# Patient Record
Sex: FEMALE | Race: WHITE | Employment: PART TIME | URBAN - METROPOLITAN AREA
[De-identification: names, ages, dates, MRNs, and addresses within clinical notes are randomized per-mention and may not be internally consistent; named-entity substitution may affect disease eponyms.]

---
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
## 2019-09-20 LAB
BLD GP AB SCN SERPL QL: NEGATIVE
HBV SURFACE AB SER QL: NEGATIVE
HIV 1+2 AB+HIV1 P24 AG SERPL QL IA: NON REACTIVE
RPR SER QL: NON REACTIVE
RUBV IGG SERPL IA-ACNC: NORMAL

## 2019-10-08 ENCOUNTER — HOSPITAL ENCOUNTER (EMERGENCY)
Facility: HOSPITAL | Age: 19
Discharge: HOME | End: 2019-10-08
Attending: PHYSICIAN ASSISTANT
Payer: MEDICAID

## 2019-10-08 VITALS
HEART RATE: 81 BPM | BODY MASS INDEX: 28.13 KG/M2 | HEIGHT: 66 IN | OXYGEN SATURATION: 100 % | DIASTOLIC BLOOD PRESSURE: 61 MMHG | TEMPERATURE: 97 F | SYSTOLIC BLOOD PRESSURE: 103 MMHG | WEIGHT: 175 LBS | RESPIRATION RATE: 18 BRPM

## 2019-10-08 DIAGNOSIS — O23.41 URINARY TRACT INFECTION IN MOTHER DURING FIRST TRIMESTER OF PREGNANCY: Primary | ICD-10-CM

## 2019-10-08 DIAGNOSIS — R19.7 NAUSEA VOMITING AND DIARRHEA: ICD-10-CM

## 2019-10-08 DIAGNOSIS — R11.2 NAUSEA VOMITING AND DIARRHEA: ICD-10-CM

## 2019-10-08 PROCEDURE — 81001 URINALYSIS AUTO W/SCOPE: CPT | Performed by: PHYSICIAN ASSISTANT

## 2019-10-08 PROCEDURE — 81025 URINE PREGNANCY TEST: CPT

## 2019-10-08 PROCEDURE — 81025 URINE PREGNANCY TEST: CPT | Performed by: PHYSICIAN ASSISTANT

## 2019-10-08 PROCEDURE — 80048 BASIC METABOLIC PNL TOTAL CA: CPT | Performed by: PHYSICIAN ASSISTANT

## 2019-10-08 PROCEDURE — 85025 COMPLETE CBC W/AUTO DIFF WBC: CPT | Performed by: PHYSICIAN ASSISTANT

## 2019-10-08 PROCEDURE — 87086 URINE CULTURE/COLONY COUNT: CPT | Performed by: PHYSICIAN ASSISTANT

## 2019-10-08 PROCEDURE — 96374 THER/PROPH/DIAG INJ IV PUSH: CPT

## 2019-10-08 PROCEDURE — 84702 CHORIONIC GONADOTROPIN TEST: CPT | Performed by: PHYSICIAN ASSISTANT

## 2019-10-08 PROCEDURE — 96361 HYDRATE IV INFUSION ADD-ON: CPT

## 2019-10-08 PROCEDURE — 99284 EMERGENCY DEPT VISIT MOD MDM: CPT

## 2019-10-08 RX ORDER — ONDANSETRON 4 MG/1
4 TABLET, ORALLY DISINTEGRATING ORAL EVERY 6 HOURS PRN
Qty: 10 TABLET | Refills: 0 | Status: SHIPPED | OUTPATIENT
Start: 2019-10-08 | End: 2019-10-11

## 2019-10-08 RX ORDER — ONDANSETRON 2 MG/ML
4 INJECTION INTRAMUSCULAR; INTRAVENOUS ONCE
Status: COMPLETED | OUTPATIENT
Start: 2019-10-08 | End: 2019-10-08

## 2019-10-08 RX ORDER — CEPHALEXIN 500 MG/1
500 CAPSULE ORAL 3 TIMES DAILY
Qty: 21 CAPSULE | Refills: 0 | Status: SHIPPED | OUTPATIENT
Start: 2019-10-08 | End: 2019-10-08

## 2019-10-08 RX ORDER — ONDANSETRON 4 MG/1
4 TABLET, ORALLY DISINTEGRATING ORAL EVERY 6 HOURS PRN
Qty: 10 TABLET | Refills: 0 | Status: SHIPPED | OUTPATIENT
Start: 2019-10-08 | End: 2019-10-08

## 2019-10-08 RX ORDER — CEPHALEXIN 500 MG/1
500 CAPSULE ORAL 3 TIMES DAILY
Qty: 21 CAPSULE | Refills: 0 | Status: SHIPPED | OUTPATIENT
Start: 2019-10-08 | End: 2019-10-15

## 2019-10-08 NOTE — ED PROVIDER NOTES
Patient Seen in: [facility] AND [facility] Emergency Department    History   Patient presents with:  Pregnancy Issues (gynecologic)    Stated Complaint:     HPI    Patient is G 2, P 1, 12 weeks pregnant 19-year-old female presents with chief complaint of nausea, All other systems reviewed and negative except as noted above. PSFH elements reviewed from today and agreed except as otherwise stated in HPI. Physical Exam     ED Triage Vitals [10/08/19 1032]   /76   Pulse 104   Resp 20   Temp 97 °F (36. for the following components:       Result Value    Ketones Urine Trace (*)     Protein Urine 30  (*)     Urobilinogen Urine 2.0 (*)     Nitrite Urine Positive (*)     Leukocyte Esterase Urine Small (*)     WBC Urine 26 (*)     WBC Clump Few (*)     Bacter emergency department without emesis. Results reviewed and need for follow-up discussed with patient. Patient case discussed with and patient seen by Dr. Jaylin Shah.     Disposition and Plan     Clinical Impression:  Urinary tract infection in mother during f

## 2019-10-08 NOTE — ED NOTES
Patient arrives 12 weeks pregnant with complaints of nausea, vomiting, and generalized abdominal pain that began this AM around 0130. Patient denies vaginal bleeding.  Patient states she has hx of pre-eclampsia in first pregnancy, states she is anxious it w

## 2019-10-08 NOTE — ED INITIAL ASSESSMENT (HPI)
Pt reports 12 weeks with c/o generalized abd pain with nausea, vomiting. Pt reports recent US showing IUP. Denies vaginal bleeding.

## 2020-03-16 ENCOUNTER — HOSPITAL ENCOUNTER (OUTPATIENT)
Age: 20
End: 2020-03-16
Attending: OBSTETRICS & GYNECOLOGY | Admitting: OBSTETRICS & GYNECOLOGY

## 2020-03-16 ENCOUNTER — HOSPITAL ENCOUNTER (OUTPATIENT)
Dept: OBGYN | Age: 20
Discharge: HOME | End: 2020-03-16
Attending: OBSTETRICS & GYNECOLOGY

## 2020-03-16 ENCOUNTER — HOSPITAL ENCOUNTER (OUTPATIENT)
Dept: LAB | Age: 20
Discharge: HOME | End: 2020-03-16
Attending: OBSTETRICS & GYNECOLOGY

## 2020-03-16 DIAGNOSIS — Z34.83 PRENATAL CARE, SUBSEQUENT PREGNANCY, THIRD TRIMESTER: Primary | ICD-10-CM

## 2020-03-16 LAB
HIV 1+2 AB+HIV1 P24 AG SERPL QL IA: NON REACTIVE
NUM BPU REQUESTED: 1
RPR SER QL: NON REACTIVE

## 2020-03-16 PROCEDURE — 85461 HEMOGLOBIN FETAL: CPT

## 2020-03-16 PROCEDURE — 36415 COLL VENOUS BLD VENIPUNCTURE: CPT

## 2020-03-17 LAB
ABO + RH BLD: NORMAL
BLD PROD TYP BPU: NORMAL
BLD UNIT ID BPU: NORMAL
FETAL CELL SCN BLD QL ROSETTE: NEGATIVE
STATUS OF UNIT: NORMAL
UNIT DIVISION: 0

## 2020-03-31 LAB — GBS EXTERNAL: NEGATIVE

## 2020-04-07 RX ORDER — VITAMIN A ACETATE, BETA CAROTENE, ASCORBIC ACID, CHOLECALCIFEROL, .ALPHA.-TOCOPHEROL ACETATE, DL-, THIAMINE MONONITRATE, RIBOFLAVIN, NIACINAMIDE, PYRIDOXINE HYDROCHLORIDE, FOLIC ACID, CYANOCOBALAMIN, CALCIUM CARBONATE, FERROUS FUMARATE, ZINC OXIDE, CUPRIC OXIDE 3080; 12; 120; 400; 1; 1.84; 3; 20; 22; 920; 25; 200; 27; 10; 2 [IU]/1; UG/1; MG/1; [IU]/1; MG/1; MG/1; MG/1; MG/1; MG/1; [IU]/1; MG/1; MG/1; MG/1; MG/1; MG/1
1 TABLET, FILM COATED ORAL DAILY
Status: ON HOLD | COMMUNITY
End: 2022-05-13 | Stop reason: CLARIF

## 2020-04-15 ENCOUNTER — HOSPITAL ENCOUNTER (INPATIENT)
Age: 20
LOS: 2 days | Discharge: HOME | DRG: 540 | End: 2020-04-17
Attending: OBSTETRICS & GYNECOLOGY | Admitting: OBSTETRICS & GYNECOLOGY

## 2020-04-15 ENCOUNTER — ANESTHESIA (OUTPATIENT)
Dept: OBGYN | Age: 20
DRG: 540 | End: 2020-04-15

## 2020-04-15 ENCOUNTER — ANESTHESIA EVENT (OUTPATIENT)
Dept: OBGYN | Age: 20
DRG: 540 | End: 2020-04-15

## 2020-04-15 LAB
ABO + RH BLD: NORMAL
BASOPHILS # BLD: 0 K/MCL (ref 0–0.3)
BASOPHILS NFR BLD: 0 %
BLD GP AB SCN SERPL QL GEL: NORMAL
BLOOD GROUP ANTIBODIES SERPL: NORMAL
CROSSMATCH EXPIRE: NORMAL
DIFFERENTIAL METHOD BLD: ABNORMAL
EOSINOPHIL # BLD: 0 K/MCL (ref 0.1–0.5)
EOSINOPHIL NFR BLD: 0 %
ERYTHROCYTE [DISTWIDTH] IN BLOOD: 13.7 % (ref 11–15)
HCT VFR BLD CALC: 31.7 % (ref 36–46.5)
HGB BLD-MCNC: 10.2 G/DL (ref 12–15.5)
IMM GRANULOCYTES # BLD AUTO: 0 K/MCL (ref 0–0.2)
IMM GRANULOCYTES NFR BLD: 0 %
LYMPHOCYTES # BLD: 1.7 K/MCL (ref 1.2–5.2)
LYMPHOCYTES NFR BLD: 23 %
MCH RBC QN AUTO: 26.9 PG (ref 26–34)
MCHC RBC AUTO-ENTMCNC: 32.2 G/DL (ref 32–36.5)
MCV RBC AUTO: 83.6 FL (ref 78–100)
MONOCYTES # BLD: 0.6 K/MCL (ref 0.3–0.9)
MONOCYTES NFR BLD: 9 %
NEUTROPHILS # BLD: 4.9 K/MCL (ref 1.8–8)
NEUTROPHILS NFR BLD: 68 %
NRBC BLD MANUAL-RTO: 0 /100 WBC
PLATELET # BLD: 132 K/MCL (ref 140–450)
RBC # BLD: 3.79 MIL/MCL (ref 4–5.2)
WBC # BLD: 7.3 K/MCL (ref 4.2–11)

## 2020-04-15 PROCEDURE — 10002800 HB RX 250 W HCPCS

## 2020-04-15 PROCEDURE — 10002800 HB RX 250 W HCPCS: Performed by: OBSTETRICS & GYNECOLOGY

## 2020-04-15 PROCEDURE — 85025 COMPLETE CBC W/AUTO DIFF WBC: CPT

## 2020-04-15 PROCEDURE — 13000037 HB COMPLEX CASE EACH ADD MINUTE: Performed by: OBSTETRICS & GYNECOLOGY

## 2020-04-15 PROCEDURE — 10004452 HB PACU ADDL 30 MINUTES

## 2020-04-15 PROCEDURE — 86870 RBC ANTIBODY IDENTIFICATION: CPT

## 2020-04-15 PROCEDURE — 86850 RBC ANTIBODY SCREEN: CPT

## 2020-04-15 PROCEDURE — 10002803 HB RX 637: Performed by: OBSTETRICS & GYNECOLOGY

## 2020-04-15 PROCEDURE — 10006023 HB SUPPLY 272: Performed by: OBSTETRICS & GYNECOLOGY

## 2020-04-15 PROCEDURE — 10000002 HB ROOM CHARGE MED SURG

## 2020-04-15 PROCEDURE — 10002807 HB RX 258: Performed by: OBSTETRICS & GYNECOLOGY

## 2020-04-15 PROCEDURE — 13000036 HB COMPLEX  CASE S/U + 1ST 15 MIN: Performed by: OBSTETRICS & GYNECOLOGY

## 2020-04-15 PROCEDURE — 10004452 HB PACU ADDL 30 MINUTES: Performed by: OBSTETRICS & GYNECOLOGY

## 2020-04-15 PROCEDURE — 10004451 HB PACU RECOVERY 1ST 30 MINUTES: Performed by: OBSTETRICS & GYNECOLOGY

## 2020-04-15 PROCEDURE — 13000018 HB ANESTHESIA BLOCK IN L&D: Performed by: OBSTETRICS & GYNECOLOGY

## 2020-04-15 PROCEDURE — 10004451 HB PACU RECOVERY 1ST 30 MINUTES

## 2020-04-15 PROCEDURE — 13001456 HB PERINATAL CARE

## 2020-04-15 PROCEDURE — 10002807 HB RX 258

## 2020-04-15 RX ORDER — ACETAMINOPHEN 500 MG
1000 TABLET ORAL EVERY 8 HOURS SCHEDULED
Status: DISCONTINUED | OUTPATIENT
Start: 2020-04-15 | End: 2020-04-17 | Stop reason: HOSPADM

## 2020-04-15 RX ORDER — 0.9 % SODIUM CHLORIDE 0.9 %
2 VIAL (ML) INJECTION EVERY 12 HOURS SCHEDULED
Status: DISCONTINUED | OUTPATIENT
Start: 2020-04-15 | End: 2020-04-15

## 2020-04-15 RX ORDER — ONDANSETRON 2 MG/ML
4 INJECTION INTRAMUSCULAR; INTRAVENOUS 2 TIMES DAILY PRN
Status: DISCONTINUED | OUTPATIENT
Start: 2020-04-15 | End: 2020-04-15

## 2020-04-15 RX ORDER — CALCIUM CARBONATE 500 MG/1
500 TABLET, CHEWABLE ORAL EVERY 4 HOURS PRN
Status: DISCONTINUED | OUTPATIENT
Start: 2020-04-15 | End: 2020-04-17 | Stop reason: HOSPADM

## 2020-04-15 RX ORDER — SODIUM CHLORIDE, SODIUM LACTATE, POTASSIUM CHLORIDE, CALCIUM CHLORIDE 600; 310; 30; 20 MG/100ML; MG/100ML; MG/100ML; MG/100ML
INJECTION, SOLUTION INTRAVENOUS CONTINUOUS
Status: DISCONTINUED | OUTPATIENT
Start: 2020-04-15 | End: 2020-04-17 | Stop reason: HOSPADM

## 2020-04-15 RX ORDER — SIMETHICONE 80 MG
80 TABLET,CHEWABLE ORAL 4 TIMES DAILY PRN
Status: DISCONTINUED | OUTPATIENT
Start: 2020-04-15 | End: 2020-04-17 | Stop reason: HOSPADM

## 2020-04-15 RX ORDER — DIPHENHYDRAMINE HYDROCHLORIDE 50 MG/ML
25 INJECTION INTRAMUSCULAR; INTRAVENOUS
Status: DISCONTINUED | OUTPATIENT
Start: 2020-04-15 | End: 2020-04-15

## 2020-04-15 RX ORDER — VITAMIN A, VITAMIN C, VITAMIN D-3, VITAMIN E, VITAMIN B-1, VITAMIN B-2, NIACIN, VITAMIN B-6, CALCIUM, IRON, ZINC, COPPER 4000; 120; 400; 22; 1.84; 3; 20; 10; 1; 12; 200; 27; 25; 2 [IU]/1; MG/1; [IU]/1; MG/1; MG/1; MG/1; MG/1; MG/1; MG/1; UG/1; MG/1; MG/1; MG/1; MG/1
1 TABLET ORAL DAILY
Status: DISCONTINUED | OUTPATIENT
Start: 2020-04-15 | End: 2020-04-17 | Stop reason: HOSPADM

## 2020-04-15 RX ORDER — SODIUM CHLORIDE, SODIUM LACTATE, POTASSIUM CHLORIDE, CALCIUM CHLORIDE 600; 310; 30; 20 MG/100ML; MG/100ML; MG/100ML; MG/100ML
INJECTION, SOLUTION INTRAVENOUS CONTINUOUS
Status: DISCONTINUED | OUTPATIENT
Start: 2020-04-15 | End: 2020-04-15 | Stop reason: SDUPTHER

## 2020-04-15 RX ORDER — LABETALOL 200 MG/1
200 TABLET, FILM COATED ORAL EVERY 12 HOURS SCHEDULED
Status: DISCONTINUED | OUTPATIENT
Start: 2020-04-15 | End: 2020-04-17 | Stop reason: HOSPADM

## 2020-04-15 RX ORDER — ONDANSETRON 2 MG/ML
4 INJECTION INTRAMUSCULAR; INTRAVENOUS EVERY 12 HOURS PRN
Status: DISCONTINUED | OUTPATIENT
Start: 2020-04-15 | End: 2020-04-17 | Stop reason: HOSPADM

## 2020-04-15 RX ORDER — IBUPROFEN 600 MG/1
600 TABLET ORAL EVERY 6 HOURS SCHEDULED
Status: DISCONTINUED | OUTPATIENT
Start: 2020-04-16 | End: 2020-04-17 | Stop reason: HOSPADM

## 2020-04-15 RX ORDER — PHENYLEPHRINE HYDROCHLORIDE 10 MG/ML
INJECTION, SOLUTION INTRAMUSCULAR; INTRAVENOUS; SUBCUTANEOUS PRN
Status: DISCONTINUED | OUTPATIENT
Start: 2020-04-15 | End: 2020-04-15

## 2020-04-15 RX ORDER — NALOXONE HCL 0.4 MG/ML
0.1 VIAL (ML) INJECTION PRN
Status: DISCONTINUED | OUTPATIENT
Start: 2020-04-15 | End: 2020-04-15

## 2020-04-15 RX ORDER — OXYTOCIN/0.9 % SODIUM CHLORIDE 30/500 ML
0-334 PLASTIC BAG, INJECTION (ML) INTRAVENOUS CONTINUOUS
Status: DISCONTINUED | OUTPATIENT
Start: 2020-04-15 | End: 2020-04-17 | Stop reason: HOSPADM

## 2020-04-15 RX ORDER — MIDAZOLAM HYDROCHLORIDE 1 MG/ML
INJECTION, SOLUTION INTRAMUSCULAR; INTRAVENOUS PRN
Status: DISCONTINUED | OUTPATIENT
Start: 2020-04-15 | End: 2020-04-15

## 2020-04-15 RX ORDER — OXYCODONE HYDROCHLORIDE 5 MG/1
5 TABLET ORAL EVERY 4 HOURS PRN
Status: DISCONTINUED | OUTPATIENT
Start: 2020-04-15 | End: 2020-04-17 | Stop reason: HOSPADM

## 2020-04-15 RX ORDER — AMOXICILLIN 250 MG
2 CAPSULE ORAL DAILY PRN
Status: DISCONTINUED | OUTPATIENT
Start: 2020-04-15 | End: 2020-04-17 | Stop reason: HOSPADM

## 2020-04-15 RX ORDER — HYDROCORTISONE ACETATE 25 MG/1
25 SUPPOSITORY RECTAL EVERY 8 HOURS PRN
Status: DISCONTINUED | OUTPATIENT
Start: 2020-04-15 | End: 2020-04-17 | Stop reason: HOSPADM

## 2020-04-15 RX ORDER — ONDANSETRON 2 MG/ML
INJECTION INTRAMUSCULAR; INTRAVENOUS PRN
Status: DISCONTINUED | OUTPATIENT
Start: 2020-04-15 | End: 2020-04-15

## 2020-04-15 RX ORDER — CITRIC ACID/SODIUM CITRATE 334-500MG
30 SOLUTION, ORAL ORAL ONCE
Status: DISCONTINUED | OUTPATIENT
Start: 2020-04-15 | End: 2020-04-15

## 2020-04-15 RX ORDER — OXYTOCIN/0.9 % SODIUM CHLORIDE 30/500 ML
0-334 PLASTIC BAG, INJECTION (ML) INTRAVENOUS CONTINUOUS
Status: DISCONTINUED | OUTPATIENT
Start: 2020-04-15 | End: 2020-04-15

## 2020-04-15 RX ORDER — HYDRALAZINE HYDROCHLORIDE 20 MG/ML
10 INJECTION INTRAMUSCULAR; INTRAVENOUS
Status: DISCONTINUED | OUTPATIENT
Start: 2020-04-15 | End: 2020-04-17 | Stop reason: HOSPADM

## 2020-04-15 RX ADMIN — SODIUM CHLORIDE, POTASSIUM CHLORIDE, SODIUM LACTATE AND CALCIUM CHLORIDE: 600; 310; 30; 20 INJECTION, SOLUTION INTRAVENOUS at 07:20

## 2020-04-15 RX ADMIN — CEFAZOLIN SODIUM 2000 MG: 300 INJECTION, POWDER, LYOPHILIZED, FOR SOLUTION INTRAVENOUS at 13:45

## 2020-04-15 RX ADMIN — CEFAZOLIN SODIUM 2000 MG: 300 INJECTION, POWDER, LYOPHILIZED, FOR SOLUTION INTRAVENOUS at 07:27

## 2020-04-15 RX ADMIN — PHENYLEPHRINE HYDROCHLORIDE 40 MCG: 10 INJECTION, SOLUTION INTRAMUSCULAR; INTRAVENOUS; SUBCUTANEOUS at 08:23

## 2020-04-15 RX ADMIN — KETOROLAC TROMETHAMINE 30 MG: 30 INJECTION, SOLUTION INTRAMUSCULAR at 18:17

## 2020-04-15 RX ADMIN — KETOROLAC TROMETHAMINE 30 MG: 30 INJECTION, SOLUTION INTRAMUSCULAR at 09:19

## 2020-04-15 RX ADMIN — PHENYLEPHRINE HYDROCHLORIDE 40 MCG: 10 INJECTION INTRAVENOUS at 07:50

## 2020-04-15 RX ADMIN — OXYTOCIN-SODIUM CHLORIDE 0.9% IV SOLN 30 UNIT/500ML 335 ML/HR: 30-0.9/5 SOLUTION at 08:10

## 2020-04-15 RX ADMIN — ONDANSETRON 4 MG: 2 INJECTION INTRAMUSCULAR; INTRAVENOUS at 13:54

## 2020-04-15 RX ADMIN — LABETALOL 200 MG: 200 TABLET, FILM COATED ORAL at 18:02

## 2020-04-15 RX ADMIN — ONDANSETRON 4 MG: 2 INJECTION INTRAMUSCULAR; INTRAVENOUS at 08:00

## 2020-04-15 RX ADMIN — SODIUM CHLORIDE, SODIUM LACTATE, POTASSIUM CHLORIDE, AND CALCIUM CHLORIDE: .6; .31; .03; .02 INJECTION, SOLUTION INTRAVENOUS at 12:12

## 2020-04-15 RX ADMIN — SODIUM CHLORIDE, POTASSIUM CHLORIDE, SODIUM LACTATE AND CALCIUM CHLORIDE: 600; 310; 30; 20 INJECTION, SOLUTION INTRAVENOUS at 07:50

## 2020-04-15 RX ADMIN — MIDAZOLAM HYDROCHLORIDE 2 MG: 1 INJECTION, SOLUTION INTRAMUSCULAR; INTRAVENOUS at 08:12

## 2020-04-15 ASSESSMENT — LIFESTYLE VARIABLES
ARE YOU BLIND OR DO YOU HAVE SERIOUS DIFFICULTY SEEING, EVEN WHEN WEARING GLASSES: NO
HOW OFTEN DO YOU HAVE A DRINK CONTAINING ALCOHOL: NEVER
CHRONIC/CANCER PAIN PRESENT: NO
ALCOHOL_USE_STATUS: NO OR LOW RISK WITH VALIDATED TOOL
ADL NEEDS ASSIST: NO
AUDIT-C TOTAL SCORE: 0
SHORT OF BREATH OR FATIGUE WITH ADLS: NO
ADL BEFORE ADMISSION: INDEPENDENT
HOW MANY STANDARD DRINKS CONTAINING ALCOHOL DO YOU HAVE ON A TYPICAL DAY: 0,1 OR 2
HOW OFTEN DO YOU HAVE 6 OR MORE DRINKS ON ONE OCCASION: NEVER
ARE YOU DEAF OR DO YOU HAVE SERIOUS DIFFICULTY  HEARING: NO
IS PATIENT ABLE TO COMPLETE ASSESSMENT AT THIS TIME: YES
RECENT DECLINE IN ADLS: NO

## 2020-04-15 ASSESSMENT — ACTIVITIES OF DAILY LIVING (ADL): ADL_SCORE: 12

## 2020-04-15 ASSESSMENT — COGNITIVE AND FUNCTIONAL STATUS - GENERAL
BECAUSE OF A PHYSICAL, MENTAL, OR EMOTIONAL CONDITION, DO YOU HAVE DIFFICULTY DOING ERRANDS ALONE: NO
BECAUSE OF A PHYSICAL, MENTAL, OR EMOTIONAL CONDITION, DO YOU HAVE SERIOUS DIFFICULTY CONCENTRATING, REMEMBERING OR MAKING DECISIONS: NO
DO YOU HAVE SERIOUS DIFFICULTY WALKING OR CLIMBING STAIRS: NO
DO YOU HAVE DIFFICULTY DRESSING OR BATHING: NO

## 2020-04-15 ASSESSMENT — PAIN SCALES - GENERAL
PAINLEVEL_OUTOF10: 3
PAINLEVEL_OUTOF10: 0
PAINLEVEL_OUTOF10: 1
PAINLEVEL_OUTOF10: 0

## 2020-04-16 LAB
ERYTHROCYTE [DISTWIDTH] IN BLOOD: 13.7 % (ref 11–15)
HCT VFR BLD CALC: 25.4 % (ref 36–46.5)
HGB BLD-MCNC: 8.3 G/DL (ref 12–15.5)
MCH RBC QN AUTO: 27.5 PG (ref 26–34)
MCHC RBC AUTO-ENTMCNC: 32.7 G/DL (ref 32–36.5)
MCV RBC AUTO: 84.1 FL (ref 78–100)
NRBC BLD MANUAL-RTO: 0 /100 WBC
PLATELET # BLD: 119 K/MCL (ref 140–450)
RBC # BLD: 3.02 MIL/MCL (ref 4–5.2)
WBC # BLD: 8.4 K/MCL (ref 4.2–11)

## 2020-04-16 PROCEDURE — 10000002 HB ROOM CHARGE MED SURG

## 2020-04-16 PROCEDURE — 86900 BLOOD TYPING SEROLOGIC ABO: CPT

## 2020-04-16 PROCEDURE — 10002803 HB RX 637: Performed by: OBSTETRICS & GYNECOLOGY

## 2020-04-16 PROCEDURE — 36415 COLL VENOUS BLD VENIPUNCTURE: CPT

## 2020-04-16 PROCEDURE — 10002800 HB RX 250 W HCPCS: Performed by: OBSTETRICS & GYNECOLOGY

## 2020-04-16 PROCEDURE — 10004651 HB RX, NO CHARGE ITEM: Performed by: OBSTETRICS & GYNECOLOGY

## 2020-04-16 PROCEDURE — 85027 COMPLETE CBC AUTOMATED: CPT

## 2020-04-16 RX ADMIN — IBUPROFEN 600 MG: 600 TABLET ORAL at 18:20

## 2020-04-16 RX ADMIN — OXYCODONE HYDROCHLORIDE 5 MG: 5 TABLET ORAL at 10:09

## 2020-04-16 RX ADMIN — IBUPROFEN 600 MG: 600 TABLET ORAL at 11:48

## 2020-04-16 RX ADMIN — VITAMIN A, VITAMIN C, VITAMIN D-3, VITAMIN E, VITAMIN B-1, VITAMIN B-2, NIACIN, VITAMIN B-6, CALCIUM, IRON, ZINC, COPPER 1 TABLET: 4000; 120; 400; 22; 1.84; 3; 20; 10; 1; 12; 200; 27; 25; 2 TABLET ORAL at 10:09

## 2020-04-16 RX ADMIN — SIMETHICONE CHEW TAB 80 MG 80 MG: 80 TABLET ORAL at 17:18

## 2020-04-16 RX ADMIN — ACETAMINOPHEN 1000 MG: 500 TABLET ORAL at 06:45

## 2020-04-16 RX ADMIN — SENNOSIDES AND DOCUSATE SODIUM 2 TABLET: 8.6; 5 TABLET ORAL at 10:10

## 2020-04-16 RX ADMIN — KETOROLAC TROMETHAMINE 30 MG: 30 INJECTION, SOLUTION INTRAMUSCULAR at 05:35

## 2020-04-16 RX ADMIN — ACETAMINOPHEN 1000 MG: 500 TABLET ORAL at 14:33

## 2020-04-16 RX ADMIN — LABETALOL 200 MG: 200 TABLET, FILM COATED ORAL at 18:58

## 2020-04-16 RX ADMIN — ACETAMINOPHEN 1000 MG: 500 TABLET ORAL at 22:34

## 2020-04-16 RX ADMIN — KETOROLAC TROMETHAMINE 30 MG: 30 INJECTION, SOLUTION INTRAMUSCULAR at 00:04

## 2020-04-16 RX ADMIN — LABETALOL 200 MG: 200 TABLET, FILM COATED ORAL at 06:45

## 2020-04-16 RX ADMIN — OXYCODONE HYDROCHLORIDE 5 MG: 5 TABLET ORAL at 19:00

## 2020-04-16 ASSESSMENT — PAIN SCALES - GENERAL
PAINLEVEL_OUTOF10: 3
PAINLEVEL_OUTOF10: 2
PAINLEVEL_OUTOF10: 3
PAINLEVEL_OUTOF10: 2
PAINLEVEL_OUTOF10: 3
PAINLEVEL_OUTOF10: 3

## 2020-04-16 ASSESSMENT — PAIN DESCRIPTION - PAIN TYPE: TYPE: ACUTE PAIN

## 2020-04-16 ASSESSMENT — PAIN SCALES - WONG BAKER
WONGBAKER_NUMERICALRESPONSE: 0

## 2020-04-17 VITALS
TEMPERATURE: 98.1 F | BODY MASS INDEX: 35.84 KG/M2 | HEART RATE: 70 BPM | OXYGEN SATURATION: 100 % | HEIGHT: 66 IN | WEIGHT: 223 LBS | RESPIRATION RATE: 16 BRPM | DIASTOLIC BLOOD PRESSURE: 83 MMHG | SYSTOLIC BLOOD PRESSURE: 127 MMHG

## 2020-04-17 PROCEDURE — 10002803 HB RX 637: Performed by: OBSTETRICS & GYNECOLOGY

## 2020-04-17 PROCEDURE — 90792 PSYCH DIAG EVAL W/MED SRVCS: CPT | Performed by: PSYCHIATRY & NEUROLOGY

## 2020-04-17 PROCEDURE — 13001086 HB INCENTIVE SPIROMETER W INSTRUCT

## 2020-04-17 PROCEDURE — 10002016 HB COUNTER INCENTIVE SPIROMETRY

## 2020-04-17 PROCEDURE — 10004651 HB RX, NO CHARGE ITEM: Performed by: OBSTETRICS & GYNECOLOGY

## 2020-04-17 RX ORDER — IBUPROFEN 600 MG/1
600 TABLET ORAL EVERY 6 HOURS PRN
Qty: 50 TABLET | Refills: 1 | Status: ON HOLD | OUTPATIENT
Start: 2020-04-17 | End: 2022-05-13 | Stop reason: CLARIF

## 2020-04-17 RX ORDER — HYDROCODONE BITARTRATE AND ACETAMINOPHEN 10; 325 MG/1; MG/1
1 TABLET ORAL EVERY 6 HOURS PRN
Qty: 24 TABLET | Refills: 0 | Status: ON HOLD | OUTPATIENT
Start: 2020-04-17 | End: 2022-05-13 | Stop reason: CLARIF

## 2020-04-17 RX ORDER — SERTRALINE HYDROCHLORIDE 25 MG/1
25 TABLET, FILM COATED ORAL DAILY
Status: DISCONTINUED | OUTPATIENT
Start: 2020-04-17 | End: 2020-04-17

## 2020-04-17 RX ADMIN — ACETAMINOPHEN 1000 MG: 500 TABLET ORAL at 06:30

## 2020-04-17 RX ADMIN — IBUPROFEN 600 MG: 600 TABLET ORAL at 00:16

## 2020-04-17 RX ADMIN — ACETAMINOPHEN 1000 MG: 500 TABLET ORAL at 14:11

## 2020-04-17 RX ADMIN — IBUPROFEN 600 MG: 600 TABLET ORAL at 06:30

## 2020-04-17 RX ADMIN — IBUPROFEN 600 MG: 600 TABLET ORAL at 12:27

## 2020-04-17 RX ADMIN — OXYCODONE HYDROCHLORIDE 5 MG: 5 TABLET ORAL at 03:30

## 2020-04-17 RX ADMIN — OXYCODONE HYDROCHLORIDE 5 MG: 5 TABLET ORAL at 08:58

## 2020-04-17 RX ADMIN — LABETALOL 200 MG: 200 TABLET, FILM COATED ORAL at 07:00

## 2020-04-17 RX ADMIN — VITAMIN A, VITAMIN C, VITAMIN D-3, VITAMIN E, VITAMIN B-1, VITAMIN B-2, NIACIN, VITAMIN B-6, CALCIUM, IRON, ZINC, COPPER 1 TABLET: 4000; 120; 400; 22; 1.84; 3; 20; 10; 1; 12; 200; 27; 25; 2 TABLET ORAL at 08:58

## 2020-04-17 ASSESSMENT — PAIN SCALES - GENERAL
PAINLEVEL_OUTOF10: 3

## 2020-09-04 ENCOUNTER — APPOINTMENT (OUTPATIENT)
Dept: BEHAVIORAL HEALTH | Age: 20
End: 2020-09-04

## 2020-09-14 ENCOUNTER — APPOINTMENT (OUTPATIENT)
Dept: BEHAVIORAL HEALTH | Age: 20
End: 2020-09-14

## 2020-10-15 ENCOUNTER — MED REC SCAN ONLY (OUTPATIENT)
Dept: FAMILY MEDICINE CLINIC | Facility: CLINIC | Age: 20
End: 2020-10-15

## 2020-10-22 ENCOUNTER — OFFICE VISIT (OUTPATIENT)
Dept: FAMILY MEDICINE CLINIC | Facility: CLINIC | Age: 20
End: 2020-10-22
Payer: MEDICAID

## 2020-10-22 ENCOUNTER — HOSPITAL ENCOUNTER (OUTPATIENT)
Dept: GENERAL RADIOLOGY | Age: 20
Discharge: HOME | End: 2020-10-22
Attending: FAMILY MEDICINE
Payer: MEDICAID

## 2020-10-22 VITALS
WEIGHT: 186 LBS | HEART RATE: 73 BPM | SYSTOLIC BLOOD PRESSURE: 133 MMHG | HEIGHT: 66 IN | DIASTOLIC BLOOD PRESSURE: 82 MMHG | BODY MASS INDEX: 29.89 KG/M2

## 2020-10-22 DIAGNOSIS — M25.521 RIGHT ELBOW PAIN: ICD-10-CM

## 2020-10-22 DIAGNOSIS — M25.521 RIGHT ELBOW PAIN: Primary | ICD-10-CM

## 2020-10-22 PROCEDURE — 73080 X-RAY EXAM OF ELBOW: CPT | Performed by: FAMILY MEDICINE

## 2020-10-22 PROCEDURE — 3008F BODY MASS INDEX DOCD: CPT | Performed by: FAMILY MEDICINE

## 2020-10-22 PROCEDURE — 3079F DIAST BP 80-89 MM HG: CPT | Performed by: FAMILY MEDICINE

## 2020-10-22 PROCEDURE — 99214 OFFICE O/P EST MOD 30 MIN: CPT | Performed by: FAMILY MEDICINE

## 2020-10-22 PROCEDURE — 3075F SYST BP GE 130 - 139MM HG: CPT | Performed by: FAMILY MEDICINE

## 2020-10-22 NOTE — PROGRESS NOTES
HPI:    Patient ID: Tyesha Chang is a 23year old female.     HPI  Patient presents with:  Lump: lump or hernia  on abdomen with growth  Arm Pain: RT arm pain with movement due to hiting elbow when opening wine glass     Review of Systems   Constitutiona

## 2020-10-27 ENCOUNTER — TELEPHONE (OUTPATIENT)
Dept: FAMILY MEDICINE CLINIC | Facility: CLINIC | Age: 20
End: 2020-10-27

## 2020-10-27 NOTE — TELEPHONE ENCOUNTER
Pt calling for result of elbow x-ray; states saw result on MyChart but did not see Dr Larry Bernstein recommendation note. Patient informed of result and recommendations, as per provider's result note copied here.  Patient voiced understanding and agrees with rec

## 2020-11-19 ENCOUNTER — HOSPITAL ENCOUNTER (EMERGENCY)
Facility: HOSPITAL | Age: 20
Discharge: HOME | End: 2020-11-19
Attending: EMERGENCY MEDICINE
Payer: MEDICAID

## 2020-11-19 ENCOUNTER — NURSE TRIAGE (OUTPATIENT)
Dept: FAMILY MEDICINE CLINIC | Facility: CLINIC | Age: 20
End: 2020-11-19

## 2020-11-19 VITALS
HEIGHT: 66 IN | DIASTOLIC BLOOD PRESSURE: 96 MMHG | BODY MASS INDEX: 30.53 KG/M2 | HEART RATE: 75 BPM | OXYGEN SATURATION: 97 % | WEIGHT: 190 LBS | SYSTOLIC BLOOD PRESSURE: 123 MMHG | RESPIRATION RATE: 18 BRPM | TEMPERATURE: 97 F

## 2020-11-19 DIAGNOSIS — G43.109 MIGRAINE WITH AURA AND WITHOUT STATUS MIGRAINOSUS, NOT INTRACTABLE: Primary | ICD-10-CM

## 2020-11-19 PROCEDURE — 81025 URINE PREGNANCY TEST: CPT

## 2020-11-19 PROCEDURE — 99282 EMERGENCY DEPT VISIT SF MDM: CPT

## 2020-11-19 NOTE — ED INITIAL ASSESSMENT (HPI)
Pt arrived to ED from work c/o headache, blurry vision and spots that started approx 1 hour PTA. Pt reports nausea and 2 episodes of emesis, pt also reports photosensitivity. Pt denies hx of migraines or headaches, denies trauma to head.

## 2020-11-19 NOTE — TELEPHONE ENCOUNTER
Recommend see if optometry  ophtho can see her promptly Right UE venous duplex completed. Final results to follow.

## 2020-11-19 NOTE — ED NOTES
Pt to ED via private vehicle with family- states at work and began having bilateral eye blurriness, worse in left eye, that lasted approximately 1-10 minutes and then subsided. Pt went to [facility] and there \"no one was able to tell me anything. \"

## 2020-11-19 NOTE — TELEPHONE ENCOUNTER
Action Requested: Summary for Provider     []  Critical Lab, Recommendations Needed  [] Need Additional Advice  []   FYI    []   Need Orders  [] Need Medications Sent to Pharmacy  []  Other     SUMMARY: pt states she started having vision problem in her le

## 2020-11-20 NOTE — ED PROVIDER NOTES
Patient Seen in: [facility] AND [facility] Emergency Department    History   Patient presents with:  Headache  Eye Visual Problem    Stated Complaint: headache and blurred vision while working on the computer    HPI  Pt c/o a 7/10 headache that began over an raymond Ht 167.6 cm (5' 6\")   Wt 86.2 kg   LMP 07/27/2020   SpO2 97%   BMI 30.67 kg/m²   PULSE OX Nl on room air    Constitutional:  No acute distress  HEENT:  Head normocephalic and atraumatic. No scleral icterus or erythema.  Pharynx moist without erythema or ex

## 2020-11-22 ENCOUNTER — HOSPITAL ENCOUNTER (OUTPATIENT)
Age: 20
Discharge: HOME | End: 2020-11-22
Payer: MEDICAID

## 2020-11-22 VITALS
RESPIRATION RATE: 18 BRPM | SYSTOLIC BLOOD PRESSURE: 137 MMHG | DIASTOLIC BLOOD PRESSURE: 82 MMHG | HEART RATE: 87 BPM | OXYGEN SATURATION: 99 % | TEMPERATURE: 98 F

## 2020-11-22 DIAGNOSIS — Z20.822 ENCOUNTER FOR LABORATORY TESTING FOR COVID-19 VIRUS: Primary | ICD-10-CM

## 2020-11-22 PROCEDURE — 99213 OFFICE O/P EST LOW 20 MIN: CPT

## 2020-11-22 NOTE — ED INITIAL ASSESSMENT (HPI)
PATIENT ARRIVED AMBULATORY TO ROOM FOR COVID TESTING. PATIENT STATES SHE LIVES WITH HER MOTHER WHO TESTED POSITIVE. NO SYMPTOMS.

## 2020-11-22 NOTE — ED PROVIDER NOTES
Patient Seen in: [facility]      History   Patient presents with:  Testing    Stated Complaint: covid test    HPI    24 yo female here for COVID testing. Pt's mother (whom she lives with) tested + for Henri recently.   Pt is asymptomatic and Rate and Rhythm: Normal rate. Pulmonary:      Effort: Pulmonary effort is normal.   Abdominal:      General: Abdomen is flat. Musculoskeletal: Normal range of motion. Skin:     General: Skin is warm.    Neurological:      General: No focal defici

## 2020-12-14 ENCOUNTER — HOSPITAL ENCOUNTER (EMERGENCY)
Facility: HOSPITAL | Age: 20
Discharge: HOME | End: 2020-12-14
Attending: PHYSICIAN ASSISTANT
Payer: MEDICAID

## 2020-12-14 ENCOUNTER — NURSE TRIAGE (OUTPATIENT)
Dept: FAMILY MEDICINE CLINIC | Facility: CLINIC | Age: 20
End: 2020-12-14

## 2020-12-14 VITALS
HEART RATE: 88 BPM | RESPIRATION RATE: 18 BRPM | SYSTOLIC BLOOD PRESSURE: 133 MMHG | OXYGEN SATURATION: 100 % | TEMPERATURE: 99 F | WEIGHT: 180 LBS | BODY MASS INDEX: 28.93 KG/M2 | HEIGHT: 66 IN | DIASTOLIC BLOOD PRESSURE: 80 MMHG

## 2020-12-14 DIAGNOSIS — N39.0 URINARY TRACT INFECTION WITH HEMATURIA, SITE UNSPECIFIED: Primary | ICD-10-CM

## 2020-12-14 DIAGNOSIS — R31.9 URINARY TRACT INFECTION WITH HEMATURIA, SITE UNSPECIFIED: Primary | ICD-10-CM

## 2020-12-14 DIAGNOSIS — R11.2 NAUSEA AND VOMITING IN ADULT: ICD-10-CM

## 2020-12-14 PROCEDURE — 81025 URINE PREGNANCY TEST: CPT

## 2020-12-14 PROCEDURE — 87088 URINE BACTERIA CULTURE: CPT | Performed by: PHYSICIAN ASSISTANT

## 2020-12-14 PROCEDURE — 96375 TX/PRO/DX INJ NEW DRUG ADDON: CPT

## 2020-12-14 PROCEDURE — 96374 THER/PROPH/DIAG INJ IV PUSH: CPT

## 2020-12-14 PROCEDURE — 99284 EMERGENCY DEPT VISIT MOD MDM: CPT

## 2020-12-14 PROCEDURE — 81001 URINALYSIS AUTO W/SCOPE: CPT | Performed by: PHYSICIAN ASSISTANT

## 2020-12-14 PROCEDURE — 96361 HYDRATE IV INFUSION ADD-ON: CPT

## 2020-12-14 PROCEDURE — 87086 URINE CULTURE/COLONY COUNT: CPT | Performed by: PHYSICIAN ASSISTANT

## 2020-12-14 PROCEDURE — 85025 COMPLETE CBC W/AUTO DIFF WBC: CPT | Performed by: PHYSICIAN ASSISTANT

## 2020-12-14 PROCEDURE — 80048 BASIC METABOLIC PNL TOTAL CA: CPT | Performed by: PHYSICIAN ASSISTANT

## 2020-12-14 PROCEDURE — 87186 SC STD MICRODIL/AGAR DIL: CPT | Performed by: PHYSICIAN ASSISTANT

## 2020-12-14 RX ORDER — IBUPROFEN 600 MG/1
TABLET ORAL
Qty: 20 TABLET | Refills: 0 | Status: SHIPPED | OUTPATIENT
Start: 2020-12-14 | End: 2021-08-24

## 2020-12-14 RX ORDER — CIPROFLOXACIN 500 MG/1
500 TABLET, FILM COATED ORAL 2 TIMES DAILY
Qty: 14 TABLET | Refills: 0 | Status: SHIPPED | OUTPATIENT
Start: 2020-12-14 | End: 2020-12-21

## 2020-12-14 RX ORDER — ONDANSETRON 4 MG/1
4 TABLET, ORALLY DISINTEGRATING ORAL EVERY 6 HOURS PRN
Qty: 10 TABLET | Refills: 0 | Status: SHIPPED | OUTPATIENT
Start: 2020-12-14 | End: 2020-12-17

## 2020-12-14 RX ORDER — KETOROLAC TROMETHAMINE 15 MG/ML
15 INJECTION, SOLUTION INTRAMUSCULAR; INTRAVENOUS ONCE
Status: COMPLETED | OUTPATIENT
Start: 2020-12-14 | End: 2020-12-14

## 2020-12-14 RX ORDER — ONDANSETRON 2 MG/ML
4 INJECTION INTRAMUSCULAR; INTRAVENOUS ONCE
Status: COMPLETED | OUTPATIENT
Start: 2020-12-14 | End: 2020-12-14

## 2020-12-15 NOTE — ED PROVIDER NOTES
Patient Seen in: [facility] AND [facility] Emergency Department    History   Patient presents with:  Back Pain    Stated Complaint: Back pain    HPI    Sherron Morales is a 23year old female who presents with chief complaint of bilateral low back pain.   Onset 4 d agreed except as otherwise stated in HPI.     Physical Exam     ED Triage Vitals [12/14/20 1718]   /73   Pulse 70   Resp 20   Temp 99.4 °F (37.4 °C)   Temp src Temporal   SpO2 99 %   O2 Device None (Room air)       Current:/73   Pulse 70   Temp normal speech. Skin: Skin is normal to inspection. Warm and dry. No obvious bruising. No obvious rash.       ED Course     Labs Reviewed   URINALYSIS WITH CULTURE REFLEX - Abnormal; Notable for the following components:       Result Value    Clarity Brandi Martinez [place] [id number]  [phone]    Schedule an appointment as soon as possible for a visit in 2 days  For follow-up    We recommend that you schedule follow up care with a primary care provider within the next three months to obtain basic health

## 2021-02-25 ENCOUNTER — NURSE TRIAGE (OUTPATIENT)
Dept: FAMILY MEDICINE CLINIC | Facility: CLINIC | Age: 21
End: 2021-02-25

## 2021-02-25 NOTE — TELEPHONE ENCOUNTER
Action Requested: Summary for Provider     []  Critical Lab, Recommendations Needed  [] Need Additional Advice  [x]   FYI    []   Need Orders  [] Need Medications Sent to Pharmacy  []  Other     SUMMARY: Patient states she wants to make an appointment wi

## 2021-07-02 ENCOUNTER — LAB SERVICES (OUTPATIENT)
Dept: LAB | Age: 21
End: 2021-07-02

## 2021-07-02 ENCOUNTER — LAB REQUISITION (OUTPATIENT)
Dept: LAB | Age: 21
End: 2021-07-02

## 2021-07-02 DIAGNOSIS — N39.0 URINARY TRACT INFECTION, SITE NOT SPECIFIED: ICD-10-CM

## 2021-07-02 PROCEDURE — 87086 URINE CULTURE/COLONY COUNT: CPT | Performed by: CLINICAL MEDICAL LABORATORY

## 2021-07-04 LAB — BACTERIA UR CULT: NORMAL

## 2021-07-16 ENCOUNTER — HOSPITAL ENCOUNTER (OUTPATIENT)
Age: 21
Discharge: HOME | End: 2021-07-16
Attending: EMERGENCY MEDICINE
Payer: MEDICAID

## 2021-07-16 ENCOUNTER — TELEPHONE (OUTPATIENT)
Dept: FAMILY MEDICINE CLINIC | Facility: CLINIC | Age: 21
End: 2021-07-16

## 2021-07-16 ENCOUNTER — APPOINTMENT (OUTPATIENT)
Dept: GENERAL RADIOLOGY | Age: 21
End: 2021-07-16
Attending: EMERGENCY MEDICINE
Payer: MEDICAID

## 2021-07-16 VITALS
HEART RATE: 78 BPM | RESPIRATION RATE: 18 BRPM | SYSTOLIC BLOOD PRESSURE: 117 MMHG | DIASTOLIC BLOOD PRESSURE: 58 MMHG | TEMPERATURE: 98 F

## 2021-07-16 DIAGNOSIS — S92.355A NONDISPLACED FRACTURE OF FIFTH METATARSAL BONE, LEFT FOOT, INITIAL ENCOUNTER FOR CLOSED FRACTURE: Primary | ICD-10-CM

## 2021-07-16 PROCEDURE — 29515 APPLICATION SHORT LEG SPLINT: CPT

## 2021-07-16 PROCEDURE — 99213 OFFICE O/P EST LOW 20 MIN: CPT

## 2021-07-16 PROCEDURE — 99214 OFFICE O/P EST MOD 30 MIN: CPT

## 2021-07-16 PROCEDURE — 73630 X-RAY EXAM OF FOOT: CPT | Performed by: EMERGENCY MEDICINE

## 2021-07-16 NOTE — TELEPHONE ENCOUNTER
Patient states that she had left foot pain after fall couple minutes ago. Advise patient to proceed to [facility] for evaluation. Patient verbalized understanding.

## 2021-07-16 NOTE — ED INITIAL ASSESSMENT (HPI)
Missed a couple steps and twisted left foot. Previous fx to area. Swelling and bruising dot dorsal, lateral foot. Unable to bear weight. + distal CMS.

## 2021-07-17 RX ORDER — IBUPROFEN 600 MG/1
TABLET ORAL
Qty: 20 TABLET | Refills: 0 | Status: CANCELLED | OUTPATIENT
Start: 2021-07-17

## 2021-07-17 NOTE — TELEPHONE ENCOUNTER
Patient asking for prescription for Ibuprofen to be sent to her pharmacy. Patient aware this can be bought over the counter, but asking for prescription \"so insurance will cover it\".     Patient states she fractured a bone in her foot, was seen in urgent

## 2021-07-17 NOTE — ED PROVIDER NOTES
Patient Seen in: [facility]      History   Patient presents with: Foot Injury    Stated Complaint: left foot pain    HPI/Subjective:   HPI    Missed two steps yesterday and rolled her left foot. She felt and heard a pop.  Presents now with pa ecchymosis and swelling to the base of the fifth metatarsal. Strong DP pulse. No motor or sensory deficit. Skin:     General: Skin is warm and dry. Capillary Refill: Capillary refill takes less than 2 seconds.    Neurological:      General: No focal

## 2021-07-19 ENCOUNTER — OFFICE VISIT (OUTPATIENT)
Dept: ORTHOPEDICS CLINIC | Facility: CLINIC | Age: 21
End: 2021-07-19
Payer: MEDICAID

## 2021-07-19 VITALS — WEIGHT: 165 LBS | HEIGHT: 66 IN | BODY MASS INDEX: 26.52 KG/M2

## 2021-07-19 DIAGNOSIS — S92.355A NONDISPLACED FRACTURE OF FIFTH METATARSAL BONE, LEFT FOOT, INITIAL ENCOUNTER FOR CLOSED FRACTURE: Primary | ICD-10-CM

## 2021-07-19 PROCEDURE — 3008F BODY MASS INDEX DOCD: CPT | Performed by: ORTHOPAEDIC SURGERY

## 2021-07-19 PROCEDURE — 28470 CLTX METATARSAL FX WO MNP EA: CPT | Performed by: ORTHOPAEDIC SURGERY

## 2021-07-19 PROCEDURE — 99244 OFF/OP CNSLTJ NEW/EST MOD 40: CPT | Performed by: ORTHOPAEDIC SURGERY

## 2021-07-19 RX ORDER — CYANOCOBALAMIN (VITAMIN B-12) 1000 MCG
1 TABLET, EXTENDED RELEASE ORAL 2 TIMES DAILY WITH MEALS
Qty: 60 TABLET | Refills: 0 | Status: SHIPPED | OUTPATIENT
Start: 2021-07-19 | End: 2021-08-24

## 2021-07-19 RX ORDER — MULTIVIT-MIN/FA/LYCOPEN/LUTEIN .4-300-25
1 TABLET ORAL DAILY
Qty: 60 TABLET | Refills: 0 | Status: SHIPPED | OUTPATIENT
Start: 2021-07-19 | End: 2021-08-24

## 2021-07-19 NOTE — H&P
NURSING INTAKE COMMENTS: Patient presents with: Foot Pain: fx slipped on stairs      HPI: This 24year old female presents today leftfoot pain after slip and fall on stairs at home on July 16, 2021. Patient jumped the last step and twisted the left foot. denies new nasal congestion, sinus pain or ST  LUNGS: denies shortness of breath  CARDIOVASCULAR: denies chest pain  GI: no hematemesis, no worsening heartburn, no diarrhea  : no dysuria, no blood in urine, no difficulty urinating, no incontinence  MUSCU Finalized by (CST): Kay Kate MD on 7/16/2021 at 7:08 PM             Lab Results   Component Value Date    WBC 10.5 12/14/2020    HGB 12.9 12/14/2020    .0 12/14/2020      Lab Results   Component Value Date    GLU 87 12/14/2020    BUN

## 2021-07-27 ENCOUNTER — HOSPITAL ENCOUNTER (OUTPATIENT)
Age: 21
Discharge: HOME | End: 2021-07-27
Attending: EMERGENCY MEDICINE
Payer: MEDICAID

## 2021-07-27 VITALS
RESPIRATION RATE: 20 BRPM | DIASTOLIC BLOOD PRESSURE: 75 MMHG | BODY MASS INDEX: 25.03 KG/M2 | HEIGHT: 67 IN | HEART RATE: 67 BPM | SYSTOLIC BLOOD PRESSURE: 115 MMHG | WEIGHT: 159.5 LBS | TEMPERATURE: 98 F | OXYGEN SATURATION: 97 %

## 2021-07-27 DIAGNOSIS — U07.1 COVID-19: Primary | ICD-10-CM

## 2021-07-27 LAB
S PYO AG THROAT QL: NEGATIVE
SARS-COV-2 RNA RESP QL NAA+PROBE: DETECTED

## 2021-07-27 PROCEDURE — 99213 OFFICE O/P EST LOW 20 MIN: CPT

## 2021-07-27 PROCEDURE — 99214 OFFICE O/P EST MOD 30 MIN: CPT

## 2021-07-27 PROCEDURE — 99453 REM MNTR PHYSIOL PARAM SETUP: CPT

## 2021-07-27 PROCEDURE — 87880 STREP A ASSAY W/OPTIC: CPT

## 2021-07-27 NOTE — ED PROVIDER NOTES
Patient Seen in: [facility]      History   Patient presents with:  Cough/URI    Stated Complaint: testing    HPI/Subjective:   HPI    The patient is a 50-year-old female with past history of preeclampsia presents now with nasal congestion, so tenderness  Eyes: Nonicteric sclera, no conjunctival injection  ENT: TMs are clear and flat bilaterally.   There is minimal posterior pharyngeal erythema  Chest: Clear to auscultation, no tenderness  Cardiovascular: Regular rate and rhythm without murmur  A they should continue to self-isolate and use infection control measures (e.g., wear mask, isolate, social distance, avoid sharing personal items, clean and disinfect “high touch” surfaces, and frequent handwashing) according to CDC guidelines.          The

## 2021-07-28 ENCOUNTER — TELEPHONE (OUTPATIENT)
Dept: CASE MANAGEMENT | Age: 21
End: 2021-07-28

## 2021-07-28 NOTE — TELEPHONE ENCOUNTER
What  was your temp today? - Reports body aches    How did you take your temp?     without a thermometer    What was your pulse ox today?  98%    Are you feeling short of breath today?    Yes    Is the shortness of breath better, the same, or worse than yes Shade Halter

## 2021-07-29 ENCOUNTER — TELEMEDICINE (OUTPATIENT)
Dept: FAMILY MEDICINE CLINIC | Facility: CLINIC | Age: 21
End: 2021-07-29
Payer: MEDICAID

## 2021-07-29 DIAGNOSIS — U07.1 COVID-19: Primary | ICD-10-CM

## 2021-07-29 PROCEDURE — 99213 OFFICE O/P EST LOW 20 MIN: CPT | Performed by: FAMILY MEDICINE

## 2021-07-29 NOTE — PROGRESS NOTES
Virtual Telephone Check-In    Miriam Aviles verbally consents to a Virtual/Telephone Check-In visit on 07/29/21. Patient has been referred to the [facility] website at [email or web address] to review the yearly Consent to Treat document.     Patient und

## 2021-07-29 NOTE — TELEPHONE ENCOUNTER
Follow-Up appointment today:   Future Appointments   Date Time Provider Richard Will   7/29/2021  2:00 PM Atlee How, DO [facility]     What  was your temp today? - No thermometer. Reports body aches, but has improved.      How did you t Back for tomorrow.

## 2021-07-31 NOTE — TELEPHONE ENCOUNTER
What  was your temp today? - No,     How did you take your temp?     without a thermometer    What was your pulse ox today?  98%    Are you feeling short of breath today?    Yes    Is the shortness of breath better, the same, or worse than yesterday?   antoninou

## 2021-08-02 NOTE — TELEPHONE ENCOUNTER
Day 5 of COVID monitoring  Tested positive 07/27  PAB Infusion 07/27    What  was your temp today? - 99    How did you take your temp?     with an oral thermometer    What was your pulse ox today?  98%    Are you feeling short of breath today?    Yes    Is

## 2021-08-04 NOTE — TELEPHONE ENCOUNTER
Dr. Cooper End - Do you want another Virtual Visit as Home Monitoring is almost over? Patient has questions about long-term effects of COVID and is interested in Post-COVID Neuro Clinic Referral.  Please advise. Thank you. Last visit 7/29/21.    Day 8 of H

## 2021-08-05 NOTE — TELEPHONE ENCOUNTER
If she has not had a video visit with me yet then schedule. We cant eval for post covid effects until time passes. Too early.

## 2021-08-05 NOTE — TELEPHONE ENCOUNTER
What  was your temp today? 98  How did you take your temp?   oral    What was your pulse ox today? 98  Are you feeling short of breath today?   yes    Is the shortness of breath better, the same, or worse than yesterday?  same    Are you having a cough

## 2021-08-06 NOTE — TELEPHONE ENCOUNTER
Summary: Day 10 after 7/27 Urgent Care & PAB infusion. COVID+ 7/27.      Left a message to call back

## 2021-08-09 NOTE — TELEPHONE ENCOUNTER
Call attempt. Left Voicemail to call back our office. Phone number provided with office hours. RN sent patient message via [address] Last week, patient was having shortness of breath, body aches, chest congestion, and fatigue.    Advised patient to call

## 2021-08-10 NOTE — TELEPHONE ENCOUNTER
RN spoke with patient's mother, Mandeep Lambert . She reports patient had told her she does not want calls from our office for Home Monitoring.  RN asked that mother inform patient to contact our office if any questions/concerns about symptoms, or if new or

## 2021-08-10 NOTE — TELEPHONE ENCOUNTER
Spoke with patient--declines home monitoring--, \"but today's my last day of quarantine, anyway. \"    Patient scheduled for video visit tomorrow at 0915 with Dr. Bebeto Cadet for Covid f/u. Patient advised to complete the e-check in in [employer], if active.   Juan Grace

## 2021-08-13 ENCOUNTER — TELEPHONE (OUTPATIENT)
Dept: ORTHOPEDICS CLINIC | Facility: CLINIC | Age: 21
End: 2021-08-13

## 2021-08-13 NOTE — TELEPHONE ENCOUNTER
Spoke to patient and states she is unable to see letter generated on 7/19/21 requesting letter to be released to [email or web address]. Letter copied from 7/19/21. Per patient was now able to see letter. No further action required at this time.

## 2021-08-17 ENCOUNTER — HOSPITAL ENCOUNTER (OUTPATIENT)
Dept: GENERAL RADIOLOGY | Facility: HOSPITAL | Age: 21
Discharge: HOME | End: 2021-08-17
Attending: ORTHOPAEDIC SURGERY
Payer: MEDICAID

## 2021-08-17 ENCOUNTER — TELEPHONE (OUTPATIENT)
Dept: ORTHOPEDICS CLINIC | Facility: CLINIC | Age: 21
End: 2021-08-17

## 2021-08-17 ENCOUNTER — OFFICE VISIT (OUTPATIENT)
Dept: ORTHOPEDICS CLINIC | Facility: CLINIC | Age: 21
End: 2021-08-17
Payer: MEDICAID

## 2021-08-17 VITALS — BODY MASS INDEX: 26 KG/M2 | HEIGHT: 66 IN

## 2021-08-17 DIAGNOSIS — S92.355A NONDISPLACED FRACTURE OF FIFTH METATARSAL BONE, LEFT FOOT, INITIAL ENCOUNTER FOR CLOSED FRACTURE: Primary | ICD-10-CM

## 2021-08-17 DIAGNOSIS — Z47.89 ORTHOPEDIC AFTERCARE: ICD-10-CM

## 2021-08-17 PROCEDURE — 73630 X-RAY EXAM OF FOOT: CPT | Performed by: ORTHOPAEDIC SURGERY

## 2021-08-17 PROCEDURE — 99024 POSTOP FOLLOW-UP VISIT: CPT | Performed by: ORTHOPAEDIC SURGERY

## 2021-08-17 NOTE — PROGRESS NOTES
NURSING INTAKE COMMENTS: Patient presents with:  Fracture: left foot -- Rates pain 3/10. HPI: This 24year old female presents today 5 weeks out from the left foot fifth metatarsal fracture.   She has been compliant using a cam boot and ambulating thr dysuria, no blood in urine, no difficulty urinating, no incontinence  MUSCULOSKELETAL: no other musculoskeletal complaints other than in HPI  NEURO: no numbness or tingling, no weakness or balance disorder  PSYCHE: no depression or anxiety  HEMATOLOGIC: no need a note for this. She will follow-up in the office in 4 weeks we will repeat x-rays of the left foot. If doing well will likely be her last visit. Follow Up: Return in about 4 weeks (around 9/14/2021).     Patient seen and evaluated initially by [facility], [facility]

## 2021-08-17 NOTE — TELEPHONE ENCOUNTER
Pt states caught covid  During last visit just came off quarantine on 8/10 states she needs to repeat a XRAY and needs a order and do a follow up states on crutches still with a 3 year states needs appt sooner then sept please advise

## 2021-08-24 NOTE — PROGRESS NOTES
HPI/Subjective:   Patient ID: Jennifer Simons is a 24year old female. HPI  Patient presents with:  Physical  Breast Pain: upper left breast into arm pit   Anxiety: discuss starting meds.  has used sertraline before     History/Other:   Review of Sys is no abdominal tenderness. Musculoskeletal:      Cervical back: Neck supple. Lumbar back: Normal.   Lymphadenopathy:      Cervical: No cervical adenopathy.    Skin:     Comments: No suspicious lesions above the waist exam   Neurological:      Mental

## 2021-09-20 ENCOUNTER — HOSPITAL ENCOUNTER (OUTPATIENT)
Dept: GENERAL RADIOLOGY | Facility: HOSPITAL | Age: 21
Discharge: HOME | End: 2021-09-20
Attending: ORTHOPAEDIC SURGERY
Payer: MEDICAID

## 2021-09-20 ENCOUNTER — OFFICE VISIT (OUTPATIENT)
Dept: ORTHOPEDICS CLINIC | Facility: CLINIC | Age: 21
End: 2021-09-20
Payer: MEDICAID

## 2021-09-20 DIAGNOSIS — R26.9 GAIT ABNORMALITY: Primary | ICD-10-CM

## 2021-09-20 DIAGNOSIS — Z47.89 ORTHOPEDIC AFTERCARE: ICD-10-CM

## 2021-09-20 DIAGNOSIS — S92.355A NONDISPLACED FRACTURE OF FIFTH METATARSAL BONE, LEFT FOOT, INITIAL ENCOUNTER FOR CLOSED FRACTURE: ICD-10-CM

## 2021-09-20 PROCEDURE — 73630 X-RAY EXAM OF FOOT: CPT | Performed by: ORTHOPAEDIC SURGERY

## 2021-09-20 PROCEDURE — 99024 POSTOP FOLLOW-UP VISIT: CPT | Performed by: ORTHOPAEDIC SURGERY

## 2021-09-20 NOTE — PROGRESS NOTES
NURSING INTAKE COMMENTS: Patient presents with: Follow - Up: 5th metatarsal fracture, denies any pain       HPI: This 24year old female presents today just over 2 months from left fifth metatarsal fracture.   She is taking multivitamin and calcium as dire breath  CARDIOVASCULAR: denies chest pain  GI: no hematemesis, no worsening heartburn, no diarrhea  : no dysuria, no blood in urine, no difficulty urinating, no incontinence  MUSCULOSKELETAL: no other musculoskeletal complaints other than in HPI  NEURO: displacement. SOFT TISSUES: Negative. No visible soft tissue swelling. EFFUSION: None visible. OTHER: Negative. CONCLUSION:  Subacute to chronic nondisplaced fracture involving the proximal left 5th metatarsal appears relatively similar.   As the fr

## 2021-09-23 ENCOUNTER — TELEPHONE (OUTPATIENT)
Dept: FAMILY MEDICINE CLINIC | Facility: CLINIC | Age: 21
End: 2021-09-23

## 2021-10-21 ENCOUNTER — TELEPHONE (OUTPATIENT)
Dept: ORTHOPEDICS CLINIC | Facility: CLINIC | Age: 21
End: 2021-10-21

## 2021-10-21 NOTE — TELEPHONE ENCOUNTER
Pt called stating pt has misplaced packet of papers given at last appointment. Also information on physical therapy. Can information be put on pt's mychart.   Pt aware office is closed for the day and returns Friday at 8:00am.

## 2022-01-03 ENCOUNTER — TELEPHONE (OUTPATIENT)
Dept: FAMILY MEDICINE CLINIC | Facility: CLINIC | Age: 22
End: 2022-01-03

## 2022-02-23 ENCOUNTER — NURSE TRIAGE (OUTPATIENT)
Dept: FAMILY MEDICINE CLINIC | Facility: CLINIC | Age: 22
End: 2022-02-23

## 2022-05-04 ENCOUNTER — TELEPHONE (OUTPATIENT)
Dept: ORTHOPEDICS CLINIC | Facility: CLINIC | Age: 22
End: 2022-05-04

## 2022-05-04 NOTE — TELEPHONE ENCOUNTER
Patient did not want to wait to see Dr. Anish Rodriguez.  Patient scheduled for 5/11 w/ Dr. Tena Monroy.

## 2022-05-04 NOTE — TELEPHONE ENCOUNTER
Pt called stating pt is still having a problem with the left foot. Could pt get an order for xray or should pt schedule an appointment.   Call pt

## 2022-05-10 ENCOUNTER — HOSPITAL ENCOUNTER (INPATIENT)
Age: 22
LOS: 2 days | Discharge: HOME | DRG: 751 | End: 2022-05-13
Attending: EMERGENCY MEDICINE | Admitting: PSYCHIATRY & NEUROLOGY

## 2022-05-10 DIAGNOSIS — F32.2 CURRENT SEVERE EPISODE OF MAJOR DEPRESSIVE DISORDER WITHOUT PSYCHOTIC FEATURES WITHOUT PRIOR EPISODE (CMD): Primary | ICD-10-CM

## 2022-05-10 DIAGNOSIS — R45.851 SUICIDAL IDEATION: ICD-10-CM

## 2022-05-10 LAB
ALBUMIN SERPL-MCNC: 4.5 G/DL (ref 3.6–5.1)
ALBUMIN/GLOB SERPL: 1.4 {RATIO} (ref 1–2.4)
ALP SERPL-CCNC: 48 UNITS/L (ref 45–117)
ALT SERPL-CCNC: 23 UNITS/L
ANION GAP SERPL CALC-SCNC: 10 MMOL/L (ref 10–20)
APAP SERPL-MCNC: <2 MCG/ML (ref 10–30)
AST SERPL-CCNC: 11 UNITS/L
BASOPHILS # BLD: 0 K/MCL (ref 0–0.3)
BASOPHILS NFR BLD: 0 %
BILIRUB SERPL-MCNC: 2.1 MG/DL (ref 0.2–1)
BUN SERPL-MCNC: 12 MG/DL (ref 6–20)
BUN/CREAT SERPL: 14 (ref 7–25)
CALCIUM SERPL-MCNC: 9.6 MG/DL (ref 8.4–10.2)
CHLORIDE SERPL-SCNC: 109 MMOL/L (ref 98–107)
CO2 SERPL-SCNC: 23 MMOL/L (ref 21–32)
CREAT SERPL-MCNC: 0.84 MG/DL (ref 0.51–0.95)
DEPRECATED RDW RBC: 41.5 FL (ref 39–50)
EOSINOPHIL # BLD: 0 K/MCL (ref 0–0.5)
EOSINOPHIL NFR BLD: 0 %
ERYTHROCYTE [DISTWIDTH] IN BLOOD: 12.7 % (ref 11–15)
ETHANOL SERPL-MCNC: NORMAL MG/DL
FASTING DURATION TIME PATIENT: ABNORMAL H
FLUAV RNA RESP QL NAA+PROBE: NOT DETECTED
FLUBV RNA RESP QL NAA+PROBE: NOT DETECTED
GFR SERPLBLD BASED ON 1.73 SQ M-ARVRAT: >90 ML/MIN
GLOBULIN SER-MCNC: 3.2 G/DL (ref 2–4)
GLUCOSE SERPL-MCNC: 90 MG/DL (ref 70–99)
HCG SERPL QL: NEGATIVE
HCT VFR BLD CALC: 40.3 % (ref 36–46.5)
HGB BLD-MCNC: 13.6 G/DL (ref 12–15.5)
IMM GRANULOCYTES # BLD AUTO: 0 K/MCL (ref 0–0.2)
IMM GRANULOCYTES # BLD: 0 %
LYMPHOCYTES # BLD: 1.3 K/MCL (ref 1–4.8)
LYMPHOCYTES NFR BLD: 17 %
MCH RBC QN AUTO: 30 PG (ref 26–34)
MCHC RBC AUTO-ENTMCNC: 33.7 G/DL (ref 32–36.5)
MCV RBC AUTO: 88.8 FL (ref 78–100)
MONOCYTES # BLD: 0.4 K/MCL (ref 0.3–0.9)
MONOCYTES NFR BLD: 5 %
NEUTROPHILS # BLD: 5.5 K/MCL (ref 1.8–7.7)
NEUTROPHILS NFR BLD: 78 %
NRBC BLD MANUAL-RTO: 0 /100 WBC
PLATELET # BLD AUTO: 195 K/MCL (ref 140–450)
POTASSIUM SERPL-SCNC: 3.6 MMOL/L (ref 3.4–5.1)
PROT SERPL-MCNC: 7.7 G/DL (ref 6.4–8.2)
RBC # BLD: 4.54 MIL/MCL (ref 4–5.2)
RSV AG NPH QL IA.RAPID: NOT DETECTED
SALICYLATES SERPL-MCNC: <2.8 MG/DL
SARS-COV-2 RNA RESP QL NAA+PROBE: NOT DETECTED
SERVICE CMNT-IMP: NORMAL
SERVICE CMNT-IMP: NORMAL
SODIUM SERPL-SCNC: 138 MMOL/L (ref 135–145)
WBC # BLD: 7.2 K/MCL (ref 4.2–11)

## 2022-05-10 PROCEDURE — 0241U COVID/FLU/RSV PANEL: CPT | Performed by: EMERGENCY MEDICINE

## 2022-05-10 PROCEDURE — 10002800 HB RX 250 W HCPCS: Performed by: EMERGENCY MEDICINE

## 2022-05-10 PROCEDURE — 99285 EMERGENCY DEPT VISIT HI MDM: CPT

## 2022-05-10 PROCEDURE — C9803 HOPD COVID-19 SPEC COLLECT: HCPCS

## 2022-05-10 PROCEDURE — 80143 DRUG ASSAY ACETAMINOPHEN: CPT | Performed by: EMERGENCY MEDICINE

## 2022-05-10 PROCEDURE — 80179 DRUG ASSAY SALICYLATE: CPT | Performed by: EMERGENCY MEDICINE

## 2022-05-10 PROCEDURE — 90839 PSYTX CRISIS INITIAL 60 MIN: CPT

## 2022-05-10 PROCEDURE — 96374 THER/PROPH/DIAG INJ IV PUSH: CPT

## 2022-05-10 PROCEDURE — 84703 CHORIONIC GONADOTROPIN ASSAY: CPT | Performed by: EMERGENCY MEDICINE

## 2022-05-10 PROCEDURE — 82077 ASSAY SPEC XCP UR&BREATH IA: CPT | Performed by: EMERGENCY MEDICINE

## 2022-05-10 PROCEDURE — 80053 COMPREHEN METABOLIC PANEL: CPT | Performed by: EMERGENCY MEDICINE

## 2022-05-10 PROCEDURE — 85025 COMPLETE CBC W/AUTO DIFF WBC: CPT | Performed by: EMERGENCY MEDICINE

## 2022-05-10 RX ORDER — LORAZEPAM 2 MG/ML
1 INJECTION INTRAMUSCULAR ONCE
Status: COMPLETED | OUTPATIENT
Start: 2022-05-10 | End: 2022-05-10

## 2022-05-10 RX ADMIN — LORAZEPAM 1 MG: 2 INJECTION INTRAMUSCULAR; INTRAVENOUS at 20:46

## 2022-05-11 PROBLEM — R45.851 SUICIDAL IDEATION: Status: ACTIVE | Noted: 2022-05-11

## 2022-05-11 PROBLEM — F33.1 MODERATE EPISODE OF RECURRENT MAJOR DEPRESSIVE DISORDER (CMD): Status: ACTIVE | Noted: 2022-05-11

## 2022-05-11 PROBLEM — R30.0 DYSURIA: Status: ACTIVE | Noted: 2022-05-11

## 2022-05-11 PROBLEM — F32.2 CURRENT SEVERE EPISODE OF MAJOR DEPRESSIVE DISORDER WITHOUT PSYCHOTIC FEATURES WITHOUT PRIOR EPISODE (CMD): Status: ACTIVE | Noted: 2022-05-11

## 2022-05-11 LAB
AMPHETAMINES UR QL SCN>500 NG/ML: NEGATIVE
ANION GAP SERPL CALC-SCNC: 12 MMOL/L (ref 10–20)
APPEARANCE UR: CLEAR
BACTERIA #/AREA URNS HPF: ABNORMAL /HPF
BARBITURATES UR QL SCN>200 NG/ML: NEGATIVE
BASOPHILS # BLD: 0 K/MCL (ref 0–0.3)
BASOPHILS NFR BLD: 0 %
BENZODIAZ UR QL SCN>200 NG/ML: NEGATIVE
BILIRUB UR QL STRIP: NEGATIVE
BUN SERPL-MCNC: 11 MG/DL (ref 6–20)
BUN/CREAT SERPL: 14 (ref 7–25)
BZE UR QL SCN>150 NG/ML: NEGATIVE
CALCIUM SERPL-MCNC: 9.2 MG/DL (ref 8.4–10.2)
CANNABINOIDS UR QL SCN>50 NG/ML: POSITIVE
CHLORIDE SERPL-SCNC: 109 MMOL/L (ref 98–107)
CHOLEST SERPL-MCNC: 157 MG/DL
CHOLEST/HDLC SERPL: 5.8 {RATIO}
CO2 SERPL-SCNC: 22 MMOL/L (ref 21–32)
COLOR UR: YELLOW
CREAT SERPL-MCNC: 0.79 MG/DL (ref 0.51–0.95)
DEPRECATED RDW RBC: 42.1 FL (ref 39–50)
EOSINOPHIL # BLD: 0 K/MCL (ref 0–0.5)
EOSINOPHIL NFR BLD: 0 %
ERYTHROCYTE [DISTWIDTH] IN BLOOD: 12.7 % (ref 11–15)
FASTING DURATION TIME PATIENT: ABNORMAL H
FASTING DURATION TIME PATIENT: ABNORMAL H
GFR SERPLBLD BASED ON 1.73 SQ M-ARVRAT: >90 ML/MIN
GLUCOSE SERPL-MCNC: 83 MG/DL (ref 70–99)
GLUCOSE UR STRIP-MCNC: NEGATIVE MG/DL
HBA1C MFR BLD: 4.8 % (ref 4.5–5.6)
HCT VFR BLD CALC: 38.8 % (ref 36–46.5)
HDLC SERPL-MCNC: 27 MG/DL
HGB BLD-MCNC: 12.9 G/DL (ref 12–15.5)
HGB UR QL STRIP: ABNORMAL
HYALINE CASTS #/AREA URNS LPF: ABNORMAL /LPF
IMM GRANULOCYTES # BLD AUTO: 0 K/MCL (ref 0–0.2)
IMM GRANULOCYTES # BLD: 0 %
KETONES UR STRIP-MCNC: 20 MG/DL
LDLC SERPL CALC-MCNC: 117 MG/DL
LEUKOCYTE ESTERASE UR QL STRIP: NEGATIVE
LYMPHOCYTES # BLD: 1.6 K/MCL (ref 1–4.8)
LYMPHOCYTES NFR BLD: 27 %
MCH RBC QN AUTO: 29.6 PG (ref 26–34)
MCHC RBC AUTO-ENTMCNC: 33.2 G/DL (ref 32–36.5)
MCV RBC AUTO: 89 FL (ref 78–100)
MONOCYTES # BLD: 0.5 K/MCL (ref 0.3–0.9)
MONOCYTES NFR BLD: 8 %
MUCOUS THREADS URNS QL MICRO: PRESENT
NEUTROPHILS # BLD: 3.8 K/MCL (ref 1.8–7.7)
NEUTROPHILS NFR BLD: 65 %
NITRITE UR QL STRIP: NEGATIVE
NONHDLC SERPL-MCNC: 130 MG/DL
NRBC BLD MANUAL-RTO: 0 /100 WBC
OPIATES UR QL SCN>300 NG/ML: NEGATIVE
PCP UR QL SCN>25 NG/ML: NEGATIVE
PH UR STRIP: 5 [PH] (ref 5–7)
PLATELET # BLD AUTO: 187 K/MCL (ref 140–450)
POTASSIUM SERPL-SCNC: 4.3 MMOL/L (ref 3.4–5.1)
PROT UR STRIP-MCNC: 30 MG/DL
RAINBOW EXTRA TUBES HOLD SPECIMEN: NORMAL
RBC # BLD: 4.36 MIL/MCL (ref 4–5.2)
RBC #/AREA URNS HPF: ABNORMAL /HPF
SODIUM SERPL-SCNC: 139 MMOL/L (ref 135–145)
SP GR UR STRIP: 1.02 (ref 1–1.03)
SQUAMOUS #/AREA URNS HPF: ABNORMAL /HPF
TRIGL SERPL-MCNC: 64 MG/DL
TSH SERPL-ACNC: 0.98 MCUNITS/ML (ref 0.35–5)
UROBILINOGEN UR STRIP-MCNC: 0.2 MG/DL
WBC # BLD: 6 K/MCL (ref 4.2–11)
WBC #/AREA URNS HPF: ABNORMAL /HPF

## 2022-05-11 PROCEDURE — 83036 HEMOGLOBIN GLYCOSYLATED A1C: CPT | Performed by: PSYCHIATRY & NEUROLOGY

## 2022-05-11 PROCEDURE — 84443 ASSAY THYROID STIM HORMONE: CPT | Performed by: PSYCHIATRY & NEUROLOGY

## 2022-05-11 PROCEDURE — 80048 BASIC METABOLIC PNL TOTAL CA: CPT | Performed by: PSYCHIATRY & NEUROLOGY

## 2022-05-11 PROCEDURE — 10002803 HB RX 637: Performed by: PSYCHIATRY & NEUROLOGY

## 2022-05-11 PROCEDURE — 80061 LIPID PANEL: CPT | Performed by: PSYCHIATRY & NEUROLOGY

## 2022-05-11 PROCEDURE — 90792 PSYCH DIAG EVAL W/MED SRVCS: CPT | Performed by: PSYCHIATRY & NEUROLOGY

## 2022-05-11 PROCEDURE — 36415 COLL VENOUS BLD VENIPUNCTURE: CPT | Performed by: PSYCHIATRY & NEUROLOGY

## 2022-05-11 PROCEDURE — 99253 IP/OBS CNSLTJ NEW/EST LOW 45: CPT | Performed by: INTERNAL MEDICINE

## 2022-05-11 PROCEDURE — 81001 URINALYSIS AUTO W/SCOPE: CPT | Performed by: INTERNAL MEDICINE

## 2022-05-11 PROCEDURE — 85025 COMPLETE CBC W/AUTO DIFF WBC: CPT | Performed by: PSYCHIATRY & NEUROLOGY

## 2022-05-11 PROCEDURE — 10004577 HB ROOM CHARGE PSYCH

## 2022-05-11 PROCEDURE — 80307 DRUG TEST PRSMV CHEM ANLYZR: CPT | Performed by: EMERGENCY MEDICINE

## 2022-05-11 RX ORDER — LORAZEPAM 1 MG/1
1 TABLET ORAL EVERY 4 HOURS PRN
Status: DISCONTINUED | OUTPATIENT
Start: 2022-05-11 | End: 2022-05-13 | Stop reason: HOSPADM

## 2022-05-11 RX ORDER — BENZTROPINE MESYLATE 1 MG/ML
1 INJECTION INTRAMUSCULAR; INTRAVENOUS EVERY 8 HOURS PRN
Status: DISCONTINUED | OUTPATIENT
Start: 2022-05-11 | End: 2022-05-13 | Stop reason: HOSPADM

## 2022-05-11 RX ORDER — HALOPERIDOL 5 MG/ML
5 INJECTION INTRAMUSCULAR EVERY 4 HOURS PRN
Status: DISCONTINUED | OUTPATIENT
Start: 2022-05-11 | End: 2022-05-13 | Stop reason: HOSPADM

## 2022-05-11 RX ORDER — HALOPERIDOL 5 MG/1
5 TABLET ORAL EVERY 4 HOURS PRN
Status: DISCONTINUED | OUTPATIENT
Start: 2022-05-11 | End: 2022-05-13 | Stop reason: HOSPADM

## 2022-05-11 RX ORDER — LORAZEPAM 2 MG/ML
1 INJECTION INTRAMUSCULAR EVERY 4 HOURS PRN
Status: DISCONTINUED | OUTPATIENT
Start: 2022-05-11 | End: 2022-05-13 | Stop reason: HOSPADM

## 2022-05-11 RX ORDER — ONDANSETRON 4 MG/1
4 TABLET, ORALLY DISINTEGRATING ORAL EVERY 8 HOURS PRN
Status: DISCONTINUED | OUTPATIENT
Start: 2022-05-11 | End: 2022-05-13 | Stop reason: HOSPADM

## 2022-05-11 RX ORDER — IBUPROFEN 600 MG/1
600 TABLET ORAL EVERY 6 HOURS PRN
Status: DISCONTINUED | OUTPATIENT
Start: 2022-05-11 | End: 2022-05-13 | Stop reason: HOSPADM

## 2022-05-11 RX ORDER — TRAZODONE HYDROCHLORIDE 50 MG/1
50 TABLET ORAL NIGHTLY PRN
Status: DISCONTINUED | OUTPATIENT
Start: 2022-05-11 | End: 2022-05-13 | Stop reason: HOSPADM

## 2022-05-11 RX ORDER — HYDROXYZINE HYDROCHLORIDE 25 MG/1
50 TABLET, FILM COATED ORAL 3 TIMES DAILY PRN
Status: DISCONTINUED | OUTPATIENT
Start: 2022-05-11 | End: 2022-05-13 | Stop reason: HOSPADM

## 2022-05-11 RX ORDER — MAGNESIUM HYDROXIDE/ALUMINUM HYDROXICE/SIMETHICONE 120; 1200; 1200 MG/30ML; MG/30ML; MG/30ML
20 SUSPENSION ORAL EVERY 4 HOURS PRN
Status: DISCONTINUED | OUTPATIENT
Start: 2022-05-11 | End: 2022-05-13 | Stop reason: HOSPADM

## 2022-05-11 RX ORDER — BENZTROPINE MESYLATE 1 MG/1
1 TABLET ORAL EVERY 8 HOURS PRN
Status: DISCONTINUED | OUTPATIENT
Start: 2022-05-11 | End: 2022-05-13 | Stop reason: HOSPADM

## 2022-05-11 RX ADMIN — SERTRALINE HYDROCHLORIDE 50 MG: 50 TABLET ORAL at 15:34

## 2022-05-11 RX ADMIN — HYDROXYZINE HYDROCHLORIDE 50 MG: 25 TABLET ORAL at 18:12

## 2022-05-11 RX ADMIN — HYDROXYZINE HYDROCHLORIDE 25 MG: 25 TABLET ORAL at 13:18

## 2022-05-11 ASSESSMENT — ENCOUNTER SYMPTOMS
EYES NEGATIVE: 1
NEUROLOGICAL NEGATIVE: 1
GASTROINTESTINAL NEGATIVE: 1
FEVER: 0
ENDOCRINE NEGATIVE: 1
RESPIRATORY NEGATIVE: 1
HEMATOLOGIC/LYMPHATIC NEGATIVE: 1
CONSTITUTIONAL NEGATIVE: 1
ALLERGIC/IMMUNOLOGIC NEGATIVE: 1

## 2022-05-11 ASSESSMENT — COGNITIVE AND FUNCTIONAL STATUS - GENERAL
LEVEL_OF_CONSCIOUSNESS_CALCULATED: ALERT
MOTOR_BEHAVIOR-AGITATION_CALCULATED: RESTLESS
ATTENTION_CALCULATED: REDUCED ABILITY TO MAINTAIN ATTENTION TO STIMULI
ORIENTATION: ORIENTED (PERSON/PLACE/TIME)
PERCEPTUAL_MISINTERPRETATIONS_HALLUCINATIONS: CLEAR REALITY BASED PERCEPTIONS
ARE YOU BLIND OR DO YOU HAVE SERIOUS DIFFICULTY SEEING, EVEN WHEN WEARING GLASSES: NO
INSIGHT: POOR
APPEARANCE_AND_DRESS: APPROPRIATE TO SITUATION
ARE YOU DEAF OR DO YOU HAVE SERIOUS DIFFICULTY  HEARING: NO
MEMORY: INTACT
JUDGEMENT: POOR

## 2022-05-11 ASSESSMENT — LIFESTYLE VARIABLES
SHORT OF BREATH OR FATIGUE WITH ADLS: NO
HOW MANY STANDARD DRINKS CONTAINING ALCOHOL DO YOU HAVE ON A TYPICAL DAY: 0,1 OR 2
ADL NEEDS ASSIST: NO
HOW OFTEN DO YOU HAVE 6 OR MORE DRINKS ON ONE OCCASION: NEVER
HOW OFTEN DO YOU HAVE 6 OR MORE DRINKS ON ONE OCCASION: NEVER
TOBACCO_USE_STATUS_IN_THE_LAST_30_DAYS: NO TOBACCO USED IN THE LAST 30 DAYS
HAVE YOU EVER BEEN VERBALLY, EMOTIONALLY, PHYSICALLY OR SEXUALLY ABUSED, OR ABUSED VIA SOCIAL MEDIA?: YES
AUDIT-C TOTAL SCORE: 0
ALCOHOL_USE_STATUS: NO OR LOW RISK WITH VALIDATED TOOL
HAVE YOU EVER BEEN EXPOSED TO OTHER VERY DISTURBING, TRAUMATIC OR ANXIETY PROVOKING EVENTS IN YOUR LIFETIME?: NO
HOW OFTEN DO YOU HAVE A DRINK CONTAINING ALCOHOL: NEVER
HOW OFTEN DO YOU HAVE A DRINK CONTAINING ALCOHOL: NEVER
ALCOHOL_USE_STATUS: NO OR LOW RISK WITH VALIDATED TOOL

## 2022-05-11 ASSESSMENT — PAIN SCALES - GENERAL
PAINLEVEL_OUTOF10: 2
PAINLEVEL_OUTOF10: 0
PAINLEVEL_OUTOF10: 0

## 2022-05-11 ASSESSMENT — ACTIVITIES OF DAILY LIVING (ADL)
CHRONIC_PAIN_PRESENT: NO
RECENT_DECLINE_ADL: NO
ADL_SCORE: 12
ADL_BEFORE_ADMISSION: INDEPENDENT

## 2022-05-12 PROBLEM — R45.851 SUICIDAL IDEATION: Status: RESOLVED | Noted: 2022-05-11 | Resolved: 2022-05-12

## 2022-05-12 PROBLEM — F32.2 CURRENT SEVERE EPISODE OF MAJOR DEPRESSIVE DISORDER WITHOUT PSYCHOTIC FEATURES WITHOUT PRIOR EPISODE (CMD): Status: RESOLVED | Noted: 2022-05-11 | Resolved: 2022-05-12

## 2022-05-12 PROCEDURE — 99233 SBSQ HOSP IP/OBS HIGH 50: CPT | Performed by: PSYCHIATRY & NEUROLOGY

## 2022-05-12 PROCEDURE — 10002803 HB RX 637: Performed by: PSYCHIATRY & NEUROLOGY

## 2022-05-12 PROCEDURE — 10002803 HB RX 637: Performed by: INTERNAL MEDICINE

## 2022-05-12 PROCEDURE — 10004577 HB ROOM CHARGE PSYCH

## 2022-05-12 RX ADMIN — ONDANSETRON 4 MG: 4 TABLET, ORALLY DISINTEGRATING ORAL at 08:30

## 2022-05-12 RX ADMIN — SERTRALINE HYDROCHLORIDE 50 MG: 50 TABLET ORAL at 09:30

## 2022-05-12 ASSESSMENT — PAIN SCALES - GENERAL
PAINLEVEL_OUTOF10: 0
PAINLEVEL_OUTOF10: 0

## 2022-05-13 VITALS
HEIGHT: 66 IN | TEMPERATURE: 96.8 F | OXYGEN SATURATION: 100 % | HEART RATE: 78 BPM | BODY MASS INDEX: 23.35 KG/M2 | SYSTOLIC BLOOD PRESSURE: 121 MMHG | WEIGHT: 145.28 LBS | RESPIRATION RATE: 16 BRPM | DIASTOLIC BLOOD PRESSURE: 82 MMHG

## 2022-05-13 PROCEDURE — 10002803 HB RX 637: Performed by: PSYCHIATRY & NEUROLOGY

## 2022-05-13 PROCEDURE — 99239 HOSP IP/OBS DSCHRG MGMT >30: CPT | Performed by: PSYCHIATRY & NEUROLOGY

## 2022-05-13 PROCEDURE — 10002803 HB RX 637: Performed by: INTERNAL MEDICINE

## 2022-05-13 RX ORDER — ONDANSETRON 4 MG/1
4 TABLET, ORALLY DISINTEGRATING ORAL EVERY 8 HOURS PRN
Qty: 10 TABLET | Refills: 0 | Status: SHIPPED | OUTPATIENT
Start: 2022-05-13

## 2022-05-13 RX ADMIN — ONDANSETRON 4 MG: 4 TABLET, ORALLY DISINTEGRATING ORAL at 08:05

## 2022-05-13 RX ADMIN — SERTRALINE HYDROCHLORIDE 50 MG: 50 TABLET ORAL at 08:05

## 2022-05-13 ASSESSMENT — PAIN SCALES - GENERAL: PAINLEVEL_OUTOF10: 0

## 2022-05-14 ENCOUNTER — NURSE TRIAGE (OUTPATIENT)
Dept: FAMILY MEDICINE CLINIC | Facility: CLINIC | Age: 22
End: 2022-05-14

## 2022-06-01 ENCOUNTER — APPOINTMENT (OUTPATIENT)
Dept: BEHAVIORAL HEALTH | Age: 22
End: 2022-06-01

## 2022-06-17 ENCOUNTER — TELEPHONE (OUTPATIENT)
Dept: ORTHOPEDICS CLINIC | Facility: CLINIC | Age: 22
End: 2022-06-17

## 2022-06-17 NOTE — TELEPHONE ENCOUNTER
Called pt and she states she continues to have left foot pain and thinks she needs a new Xray. I advised her per Dr. Chel Castaneda on 9/20/21 if she continued to have pain she should see a podiatrist. Pt had previous scheduled appts with Dr. Ivette Joshua on 5/4 and 5/11 but were cancelled. I offered to schedule a new appt with Dr. Ivette Joshua- appt made on 7/12 @ 130pm at [facility].

## 2022-06-24 ENCOUNTER — HOSPITAL ENCOUNTER (OUTPATIENT)
Age: 22
Discharge: HOME | End: 2022-06-24
Payer: MEDICAID

## 2022-06-24 ENCOUNTER — APPOINTMENT (OUTPATIENT)
Dept: GENERAL RADIOLOGY | Age: 22
End: 2022-06-24
Attending: PHYSICIAN ASSISTANT
Payer: MEDICAID

## 2022-06-24 VITALS
RESPIRATION RATE: 20 BRPM | OXYGEN SATURATION: 100 % | HEART RATE: 98 BPM | DIASTOLIC BLOOD PRESSURE: 69 MMHG | SYSTOLIC BLOOD PRESSURE: 123 MMHG | TEMPERATURE: 97 F

## 2022-06-24 DIAGNOSIS — S99.922A FOOT INJURY, LEFT, INITIAL ENCOUNTER: Primary | ICD-10-CM

## 2022-06-24 PROCEDURE — 99213 OFFICE O/P EST LOW 20 MIN: CPT

## 2022-06-24 PROCEDURE — 73630 X-RAY EXAM OF FOOT: CPT | Performed by: PHYSICIAN ASSISTANT

## 2022-06-25 ENCOUNTER — HOSPITAL ENCOUNTER (OUTPATIENT)
Age: 22
Discharge: HOME | End: 2022-06-25
Attending: EMERGENCY MEDICINE
Payer: MEDICAID

## 2022-06-25 VITALS
HEART RATE: 81 BPM | DIASTOLIC BLOOD PRESSURE: 69 MMHG | RESPIRATION RATE: 16 BRPM | SYSTOLIC BLOOD PRESSURE: 135 MMHG | TEMPERATURE: 98 F | OXYGEN SATURATION: 99 %

## 2022-06-25 DIAGNOSIS — S93.602A FOOT SPRAIN, LEFT, INITIAL ENCOUNTER: Primary | ICD-10-CM

## 2022-06-25 PROCEDURE — 99212 OFFICE O/P EST SF 10 MIN: CPT

## 2022-06-25 NOTE — ED INITIAL ASSESSMENT (HPI)
Was seen yesterday for injury to left foot. Returning today for recommendation post radiology report.

## 2022-07-14 ENCOUNTER — NURSE TRIAGE (OUTPATIENT)
Dept: FAMILY MEDICINE CLINIC | Facility: CLINIC | Age: 22
End: 2022-07-14

## 2022-07-14 NOTE — TELEPHONE ENCOUNTER
Patient notified of provider's recommendation. Patient verbalized understanding. Advised WIC or IC near home.

## 2022-11-12 ENCOUNTER — NURSE TRIAGE (OUTPATIENT)
Dept: FAMILY MEDICINE CLINIC | Facility: CLINIC | Age: 22
End: 2022-11-12

## 2022-11-14 ENCOUNTER — TELEPHONE (OUTPATIENT)
Dept: FAMILY MEDICINE CLINIC | Facility: CLINIC | Age: 22
End: 2022-11-14

## 2022-11-14 NOTE — TELEPHONE ENCOUNTER
Pt was called and informed of Dr. Mery Rajan message below and she verbalized. Pt mentioned that she is taking a natural remedy that on of her family members is taking called [place] is Kratom and it supposed to help with anxiety. Pt was informed to bring in the office tomorrow to show Dr. Mery Rajan. Pt will bring it in tomorrow. Pt was able to eat a little today.

## 2022-11-14 NOTE — TELEPHONE ENCOUNTER
Left message to call back on cell. Called home number which actually is Mom's number. Mom will call her and tell her to call our office.   Note patient seen yesterday in Emergency Depeartment, given lorazepam.

## 2022-11-26 ENCOUNTER — HOSPITAL ENCOUNTER (OUTPATIENT)
Age: 22
Discharge: HOME | End: 2022-11-26
Attending: EMERGENCY MEDICINE
Payer: MEDICAID

## 2022-11-26 VITALS
RESPIRATION RATE: 20 BRPM | DIASTOLIC BLOOD PRESSURE: 68 MMHG | TEMPERATURE: 100 F | HEART RATE: 100 BPM | SYSTOLIC BLOOD PRESSURE: 128 MMHG | OXYGEN SATURATION: 100 %

## 2022-11-26 DIAGNOSIS — J10.1 INFLUENZA A: Primary | ICD-10-CM

## 2022-11-26 LAB
POCT INFLUENZA A: POSITIVE
POCT INFLUENZA B: NEGATIVE

## 2022-11-26 PROCEDURE — 99212 OFFICE O/P EST SF 10 MIN: CPT

## 2022-11-26 PROCEDURE — 87502 INFLUENZA DNA AMP PROBE: CPT | Performed by: EMERGENCY MEDICINE

## 2022-11-26 PROCEDURE — 99213 OFFICE O/P EST LOW 20 MIN: CPT

## 2022-11-28 ENCOUNTER — HOSPITAL ENCOUNTER (OUTPATIENT)
Age: 22
Discharge: HOME | End: 2022-11-28
Payer: MEDICAID

## 2022-11-28 VITALS
HEIGHT: 66 IN | WEIGHT: 130 LBS | DIASTOLIC BLOOD PRESSURE: 75 MMHG | BODY MASS INDEX: 20.89 KG/M2 | TEMPERATURE: 98 F | RESPIRATION RATE: 16 BRPM | HEART RATE: 106 BPM | OXYGEN SATURATION: 100 % | SYSTOLIC BLOOD PRESSURE: 97 MMHG

## 2022-11-28 DIAGNOSIS — J11.1 INFLUENZA: Primary | ICD-10-CM

## 2022-11-28 DIAGNOSIS — Z20.822 ENCOUNTER FOR LABORATORY TESTING FOR COVID-19 VIRUS: ICD-10-CM

## 2022-11-28 DIAGNOSIS — Z20.822 LAB TEST NEGATIVE FOR COVID-19 VIRUS: ICD-10-CM

## 2022-11-28 LAB — SARS-COV-2 RNA RESP QL NAA+PROBE: NOT DETECTED

## 2022-11-28 PROCEDURE — 99213 OFFICE O/P EST LOW 20 MIN: CPT | Performed by: NURSE PRACTITIONER

## 2022-11-28 PROCEDURE — U0002 COVID-19 LAB TEST NON-CDC: HCPCS | Performed by: NURSE PRACTITIONER

## 2022-11-28 NOTE — DISCHARGE INSTRUCTIONS
Recommend over-the-counter ibuprofen or Tylenol for any pain fever or discomfort. Recommend over-the-counter Flonase and Zyrtec to help with any cough or congestion. Drink plenty of fluids and eat as tolerated follow-up with primary care provider as needed.

## 2022-11-28 NOTE — ED INITIAL ASSESSMENT (HPI)
Pt dx with flu but wants covid testing because she lives with her dad and he has \"serious health issues\"

## 2023-01-09 ENCOUNTER — HOSPITAL ENCOUNTER (OUTPATIENT)
Age: 23
Discharge: HOME | End: 2023-01-09
Payer: MEDICAID

## 2023-01-09 VITALS
RESPIRATION RATE: 18 BRPM | DIASTOLIC BLOOD PRESSURE: 82 MMHG | HEIGHT: 66 IN | HEART RATE: 102 BPM | TEMPERATURE: 99 F | OXYGEN SATURATION: 100 % | SYSTOLIC BLOOD PRESSURE: 138 MMHG | WEIGHT: 134 LBS | BODY MASS INDEX: 21.53 KG/M2

## 2023-01-09 DIAGNOSIS — N89.8 VAGINAL IRRITATION: ICD-10-CM

## 2023-01-09 DIAGNOSIS — N75.0 BARTHOLIN CYST: Primary | ICD-10-CM

## 2023-01-09 LAB
B-HCG UR QL: NEGATIVE
BILIRUB UR QL STRIP: NEGATIVE
CLARITY UR: CLEAR
COLOR UR: YELLOW
GLUCOSE UR STRIP-MCNC: NEGATIVE MG/DL
HGB UR QL STRIP: NEGATIVE
KETONES UR STRIP-MCNC: NEGATIVE MG/DL
LEUKOCYTE ESTERASE UR QL STRIP: NEGATIVE
NITRITE UR QL STRIP: NEGATIVE
PH UR STRIP: 7 [PH]
PROT UR STRIP-MCNC: NEGATIVE MG/DL
SP GR UR STRIP: 1.02
UROBILINOGEN UR STRIP-ACNC: <2 MG/DL

## 2023-01-09 PROCEDURE — 81002 URINALYSIS NONAUTO W/O SCOPE: CPT | Performed by: NURSE PRACTITIONER

## 2023-01-09 PROCEDURE — 81025 URINE PREGNANCY TEST: CPT | Performed by: NURSE PRACTITIONER

## 2023-01-09 PROCEDURE — 99213 OFFICE O/P EST LOW 20 MIN: CPT | Performed by: NURSE PRACTITIONER

## 2023-01-09 NOTE — DISCHARGE INSTRUCTIONS
No sexual activity for 2 weeks. Sitz bath's meaning soaking in warm water 2-3 times per day as this will help the cyst continue to drain. Take ibuprofen or Tylenol for pain. Wear a pad as the area will continue to drain. If any of the vaginal swabs comes back with anything that needs treatment you will be notified and will be prescribed. Follow-up with your gynecologist at your scheduled appointment. If you develop abdominal pain back pain increase in swelling in the vaginal area fevers chills nausea or vomiting go to the nearest emergency department.

## 2023-01-10 ENCOUNTER — PATIENT MESSAGE (OUTPATIENT)
Dept: FAMILY MEDICINE CLINIC | Facility: CLINIC | Age: 23
End: 2023-01-10

## 2023-01-10 LAB
C TRACH DNA SPEC QL NAA+PROBE: NEGATIVE
N GONORRHOEA DNA SPEC QL NAA+PROBE: NEGATIVE

## 2023-01-11 LAB
GENITAL VAGINOSIS SCREEN: POSITIVE
TRICHOMONAS SCREEN: NEGATIVE

## 2023-01-11 RX ORDER — FLUCONAZOLE 150 MG/1
150 TABLET ORAL ONCE
Qty: 2 TABLET | Refills: 0 | Status: SHIPPED | OUTPATIENT
Start: 2023-01-11 | End: 2023-01-11

## 2023-01-11 RX ORDER — METRONIDAZOLE 500 MG/1
500 TABLET ORAL 2 TIMES DAILY
Qty: 14 TABLET | Refills: 0 | Status: SHIPPED | OUTPATIENT
Start: 2023-01-11 | End: 2023-01-18

## 2023-02-23 ENCOUNTER — OFFICE VISIT (OUTPATIENT)
Dept: FAMILY MEDICINE CLINIC | Facility: CLINIC | Age: 23
End: 2023-02-23

## 2023-02-23 VITALS
SYSTOLIC BLOOD PRESSURE: 134 MMHG | HEIGHT: 66 IN | DIASTOLIC BLOOD PRESSURE: 84 MMHG | BODY MASS INDEX: 21.38 KG/M2 | WEIGHT: 133 LBS | HEART RATE: 89 BPM

## 2023-02-23 DIAGNOSIS — N64.4 BREAST PAIN, LEFT: Primary | ICD-10-CM

## 2023-02-23 DIAGNOSIS — R10.33 UMBILICAL PAIN: ICD-10-CM

## 2023-02-23 PROCEDURE — 3008F BODY MASS INDEX DOCD: CPT | Performed by: PHYSICIAN ASSISTANT

## 2023-02-23 PROCEDURE — 3075F SYST BP GE 130 - 139MM HG: CPT | Performed by: PHYSICIAN ASSISTANT

## 2023-02-23 PROCEDURE — 99214 OFFICE O/P EST MOD 30 MIN: CPT | Performed by: PHYSICIAN ASSISTANT

## 2023-02-23 PROCEDURE — 3079F DIAST BP 80-89 MM HG: CPT | Performed by: PHYSICIAN ASSISTANT

## 2023-09-12 ENCOUNTER — APPOINTMENT (OUTPATIENT)
Dept: GENERAL RADIOLOGY | Age: 23
End: 2023-09-12
Attending: NURSE PRACTITIONER
Payer: MEDICAID

## 2023-09-12 ENCOUNTER — HOSPITAL ENCOUNTER (OUTPATIENT)
Age: 23
Discharge: HOME | End: 2023-09-12
Attending: NURSE PRACTITIONER
Payer: MEDICAID

## 2023-09-12 VITALS
TEMPERATURE: 98 F | HEART RATE: 69 BPM | SYSTOLIC BLOOD PRESSURE: 121 MMHG | DIASTOLIC BLOOD PRESSURE: 65 MMHG | OXYGEN SATURATION: 100 % | RESPIRATION RATE: 16 BRPM

## 2023-09-12 DIAGNOSIS — S60.131A CONTUSION OF RIGHT MIDDLE FINGER WITH DAMAGE TO NAIL, INITIAL ENCOUNTER: Primary | ICD-10-CM

## 2023-09-12 PROCEDURE — 29130 APPL FINGER SPLINT STATIC: CPT

## 2023-09-12 PROCEDURE — 99214 OFFICE O/P EST MOD 30 MIN: CPT

## 2023-09-12 PROCEDURE — 73140 X-RAY EXAM OF FINGER(S): CPT | Performed by: NURSE PRACTITIONER

## 2023-09-12 PROCEDURE — 99213 OFFICE O/P EST LOW 20 MIN: CPT

## 2023-09-12 RX ORDER — CEFADROXIL 500 MG/1
500 CAPSULE ORAL 2 TIMES DAILY
Qty: 14 CAPSULE | Refills: 0 | Status: SHIPPED | OUTPATIENT
Start: 2023-09-12 | End: 2023-09-19

## 2023-09-12 NOTE — ED INITIAL ASSESSMENT (HPI)
Patient arrives ambulatory with c/o left 3rd finger pain after slamming it in a car door PTA. Last tdap 2018.

## 2023-09-12 NOTE — DISCHARGE INSTRUCTIONS
Please elevate the extremity. Keep the area clean and dry. Please follow-up with a hand specialist.  Contact information is provided in your discharge paperwork. Take antibiotics as prescribed. Any surrounding redness, drainage please follow-up immediately.

## 2024-05-30 ENCOUNTER — MED REC SCAN ONLY (OUTPATIENT)
Dept: FAMILY MEDICINE CLINIC | Facility: CLINIC | Age: 24
End: 2024-05-30

## 2024-09-16 ENCOUNTER — HOSPITAL ENCOUNTER (EMERGENCY)
Facility: HOSPITAL | Age: 24
Discharge: HOME | End: 2024-09-17
Attending: EMERGENCY MEDICINE
Payer: MEDICAID

## 2024-09-16 DIAGNOSIS — R10.9 ABDOMINAL PAIN, ACUTE: Primary | ICD-10-CM

## 2024-09-16 LAB
ALBUMIN SERPL-MCNC: 4.5 G/DL (ref 3.2–4.8)
ALBUMIN/GLOB SERPL: 1.9 {RATIO} (ref 1–2)
ALP LIVER SERPL-CCNC: 45 U/L
ALT SERPL-CCNC: 11 U/L
ANION GAP SERPL CALC-SCNC: 5 MMOL/L (ref 0–18)
AST SERPL-CCNC: 13 U/L (ref ?–34)
B-HCG UR QL: NEGATIVE
BASOPHILS # BLD AUTO: 0.02 X10(3) UL (ref 0–0.2)
BASOPHILS NFR BLD AUTO: 0.2 %
BILIRUB SERPL-MCNC: 0.8 MG/DL (ref 0.3–1.2)
BUN BLD-MCNC: 8 MG/DL (ref 9–23)
BUN/CREAT SERPL: 11.3 (ref 10–20)
CALCIUM BLD-MCNC: 9.3 MG/DL (ref 8.7–10.4)
CHLORIDE SERPL-SCNC: 107 MMOL/L (ref 98–112)
CO2 SERPL-SCNC: 28 MMOL/L (ref 21–32)
CREAT BLD-MCNC: 0.71 MG/DL
DEPRECATED RDW RBC AUTO: 42.1 FL (ref 35.1–46.3)
EGFRCR SERPLBLD CKD-EPI 2021: 121 ML/MIN/1.73M2 (ref 60–?)
EOSINOPHIL # BLD AUTO: 0.02 X10(3) UL (ref 0–0.7)
EOSINOPHIL NFR BLD AUTO: 0.2 %
ERYTHROCYTE [DISTWIDTH] IN BLOOD BY AUTOMATED COUNT: 12.7 % (ref 11–15)
GLOBULIN PLAS-MCNC: 2.4 G/DL (ref 2–3.5)
GLUCOSE BLD-MCNC: 84 MG/DL (ref 70–99)
HCT VFR BLD AUTO: 36.5 %
HGB BLD-MCNC: 12.2 G/DL
IMM GRANULOCYTES # BLD AUTO: 0.03 X10(3) UL (ref 0–1)
IMM GRANULOCYTES NFR BLD: 0.4 %
LIPASE SERPL-CCNC: 29 U/L (ref 12–53)
LYMPHOCYTES # BLD AUTO: 1.81 X10(3) UL (ref 1–4)
LYMPHOCYTES NFR BLD AUTO: 21.6 %
MCH RBC QN AUTO: 30.5 PG (ref 26–34)
MCHC RBC AUTO-ENTMCNC: 33.4 G/DL (ref 31–37)
MCV RBC AUTO: 91.3 FL
MONOCYTES # BLD AUTO: 0.58 X10(3) UL (ref 0.1–1)
MONOCYTES NFR BLD AUTO: 6.9 %
NEUTROPHILS # BLD AUTO: 5.93 X10 (3) UL (ref 1.5–7.7)
NEUTROPHILS # BLD AUTO: 5.93 X10(3) UL (ref 1.5–7.7)
NEUTROPHILS NFR BLD AUTO: 70.7 %
OSMOLALITY SERPL CALC.SUM OF ELEC: 288 MOSM/KG (ref 275–295)
PLATELET # BLD AUTO: 180 10(3)UL (ref 150–450)
POTASSIUM SERPL-SCNC: 3.5 MMOL/L (ref 3.5–5.1)
PROT SERPL-MCNC: 6.9 G/DL (ref 5.7–8.2)
RBC # BLD AUTO: 4 X10(6)UL
SODIUM SERPL-SCNC: 140 MMOL/L (ref 136–145)
WBC # BLD AUTO: 8.4 X10(3) UL (ref 4–11)

## 2024-09-16 PROCEDURE — 80053 COMPREHEN METABOLIC PANEL: CPT | Performed by: EMERGENCY MEDICINE

## 2024-09-16 PROCEDURE — 96374 THER/PROPH/DIAG INJ IV PUSH: CPT

## 2024-09-16 PROCEDURE — 96375 TX/PRO/DX INJ NEW DRUG ADDON: CPT

## 2024-09-16 PROCEDURE — 81003 URINALYSIS AUTO W/O SCOPE: CPT | Performed by: EMERGENCY MEDICINE

## 2024-09-16 PROCEDURE — 83690 ASSAY OF LIPASE: CPT | Performed by: EMERGENCY MEDICINE

## 2024-09-16 PROCEDURE — 81025 URINE PREGNANCY TEST: CPT

## 2024-09-16 PROCEDURE — 85025 COMPLETE CBC W/AUTO DIFF WBC: CPT | Performed by: EMERGENCY MEDICINE

## 2024-09-16 PROCEDURE — 96361 HYDRATE IV INFUSION ADD-ON: CPT

## 2024-09-16 PROCEDURE — 99285 EMERGENCY DEPT VISIT HI MDM: CPT

## 2024-09-16 RX ORDER — ONDANSETRON 2 MG/ML
4 INJECTION INTRAMUSCULAR; INTRAVENOUS ONCE
Status: COMPLETED | OUTPATIENT
Start: 2024-09-16 | End: 2024-09-17

## 2024-09-16 RX ORDER — SODIUM CHLORIDE 9 MG/ML
INJECTION, SOLUTION INTRAVENOUS CONTINUOUS
Status: DISCONTINUED | OUTPATIENT
Start: 2024-09-16 | End: 2024-09-17

## 2024-09-17 ENCOUNTER — APPOINTMENT (OUTPATIENT)
Dept: CT IMAGING | Facility: HOSPITAL | Age: 24
End: 2024-09-17
Attending: EMERGENCY MEDICINE
Payer: MEDICAID

## 2024-09-17 ENCOUNTER — APPOINTMENT (OUTPATIENT)
Dept: ULTRASOUND IMAGING | Facility: HOSPITAL | Age: 24
End: 2024-09-17
Attending: EMERGENCY MEDICINE
Payer: MEDICAID

## 2024-09-17 VITALS
WEIGHT: 160 LBS | DIASTOLIC BLOOD PRESSURE: 69 MMHG | BODY MASS INDEX: 25.71 KG/M2 | TEMPERATURE: 98 F | RESPIRATION RATE: 18 BRPM | OXYGEN SATURATION: 99 % | SYSTOLIC BLOOD PRESSURE: 107 MMHG | HEIGHT: 66 IN | HEART RATE: 67 BPM

## 2024-09-17 LAB
BILIRUB UR QL: NEGATIVE
CLARITY UR: CLEAR
COLOR UR: COLORLESS
GLUCOSE UR-MCNC: NORMAL MG/DL
HGB UR QL STRIP.AUTO: NEGATIVE
KETONES UR-MCNC: NEGATIVE MG/DL
LEUKOCYTE ESTERASE UR QL STRIP.AUTO: NEGATIVE
NITRITE UR QL STRIP.AUTO: NEGATIVE
PH UR: 5.5 [PH] (ref 5–8)
PROT UR-MCNC: NEGATIVE MG/DL
SP GR UR STRIP: 1.01 (ref 1–1.03)
UROBILINOGEN UR STRIP-ACNC: NORMAL

## 2024-09-17 PROCEDURE — 74176 CT ABD & PELVIS W/O CONTRAST: CPT | Performed by: EMERGENCY MEDICINE

## 2024-09-17 PROCEDURE — 76705 ECHO EXAM OF ABDOMEN: CPT | Performed by: EMERGENCY MEDICINE

## 2024-09-17 RX ORDER — ONDANSETRON 4 MG/1
4 TABLET, ORALLY DISINTEGRATING ORAL EVERY 8 HOURS PRN
Qty: 10 TABLET | Refills: 0 | Status: SHIPPED | OUTPATIENT
Start: 2024-09-17 | End: 2024-09-24

## 2024-09-17 RX ORDER — FAMOTIDINE 20 MG/1
20 TABLET, FILM COATED ORAL 2 TIMES DAILY PRN
Qty: 30 TABLET | Refills: 0 | Status: SHIPPED | OUTPATIENT
Start: 2024-09-17 | End: 2024-10-17

## 2024-09-17 RX ORDER — MORPHINE SULFATE 2 MG/ML
2 INJECTION, SOLUTION INTRAMUSCULAR; INTRAVENOUS ONCE
Status: COMPLETED | OUTPATIENT
Start: 2024-09-17 | End: 2024-09-17

## 2024-09-17 RX ORDER — DOCUSATE SODIUM 100 MG/1
100 CAPSULE, LIQUID FILLED ORAL 2 TIMES DAILY PRN
Qty: 60 CAPSULE | Refills: 0 | Status: SHIPPED | OUTPATIENT
Start: 2024-09-17 | End: 2024-10-17

## 2024-09-17 RX ORDER — KETOROLAC TROMETHAMINE 15 MG/ML
15 INJECTION, SOLUTION INTRAMUSCULAR; INTRAVENOUS ONCE
Status: COMPLETED | OUTPATIENT
Start: 2024-09-17 | End: 2024-09-17

## 2024-09-17 NOTE — ED PROVIDER NOTES
Patient Seen in: [facility] Emergency Department      History     Chief Complaint   Patient presents with    Abdominal Pain     Stated Complaint:     Subjective:   HPI    24 year old female otherwise healthy who presents with R side abd pain starting this afternoon and worsening throughout the day/evening. She was at another hospital prior to this, but states there was a longer wait time and came here. She does not think she had this pain in the past, but then recounts she thought she was getting lactose intolerant and getting pain after eating a few times in the recent past. No vomiting or fever, no vaginal dc or urinary sx. She feels constipated.    Objective:   Past Medical History:    Preeclampsia (HCC)              No pertinent past surgical history.              No pertinent social history.            Review of Systems    Positive for stated Chief Complaint: Abdominal Pain    Other systems are as noted in HPI.  Constitutional and vital signs reviewed.      All other systems reviewed and negative except as noted above.    Physical Exam     ED Triage Vitals [09/16/24 2224]   /86   Pulse 98   Resp 18   Temp 98.2 °F (36.8 °C)   Temp src Oral   SpO2 100 %   O2 Device None (Room air)       Current Vitals:   No data recorded          Physical Exam  Vitals and nursing note reviewed.   Constitutional:       General: She is not in acute distress.     Appearance: Normal appearance. She is well-developed. She is not ill-appearing, toxic-appearing or diaphoretic.   HENT:      Head: Normocephalic and atraumatic.   Eyes:      Conjunctiva/sclera: Conjunctivae normal.      Pupils: Pupils are equal, round, and reactive to light.   Cardiovascular:      Rate and Rhythm: Normal rate and regular rhythm.      Pulses: Normal pulses.      Heart sounds: Normal heart sounds. No murmur heard.  Pulmonary:      Effort: Pulmonary effort is normal. No respiratory distress.      Breath sounds: Normal breath sounds. No wheezing.    Abdominal:      General: There is no distension.      Palpations: Abdomen is soft.      Tenderness: There is abdominal tenderness in the right upper quadrant. There is no guarding.   Musculoskeletal:         General: No tenderness. Normal range of motion.      Cervical back: Full passive range of motion without pain, normal range of motion and neck supple. No rigidity. Normal range of motion.      Right lower leg: No edema.      Left lower leg: No edema.   Skin:     General: Skin is warm and dry.      Findings: No rash.   Neurological:      Mental Status: She is alert and oriented to person, place, and time.      GCS: GCS eye subscore is 4. GCS verbal subscore is 5. GCS motor subscore is 6.      Sensory: Sensation is intact. No sensory deficit.      Motor: Motor function is intact. No weakness.   Psychiatric:         Attention and Perception: Attention normal.         Mood and Affect: Mood normal.         Behavior: Behavior normal. Behavior is cooperative.             ED Course     Labs Reviewed   COMP METABOLIC PANEL (14) - Abnormal; Notable for the following components:       Result Value    BUN 8 (*)     All other components within normal limits   URINALYSIS WITH CULTURE REFLEX - Abnormal; Notable for the following components:    Urine Color Colorless (*)     All other components within normal limits   LIPASE - Normal   POCT PREGNANCY URINE - Normal   CBC WITH DIFFERENTIAL WITH PLATELET   RAINBOW DRAW BLUE          ED Course as of 09/19/24 0840  ------------------------------------------------------------  Time: 09/17 0106  Value: US GALLBLADDER (CPT=76705)  Comment: Right upper quadrant ultrasound    Comparison: None      IMPRESSION:    Mild hepatomegaly, at 17.7 cm.  No focal hepatic abnormality.    No evidence of cholelithiasis or biliary dilatation.  Cannot assess sonographic Moreno's sign due to pain medication administration.    Visualized portions of the pancreas, as well as right kidney are  unremarkable.    ------------------------------------------------------------  Time: 09/17 0329  Value: CT ABDOMEN+PELVIS(CPT=74176)  Comment: CT ABDOMEN AND PELVIS NONCONTRAST    Comparison: Right upper quadrant ultrasound from the same day      IMPRESSION:    No renal abnormality is seen.    No hydronephrosis or ureteral calculus is identified.  No bladder abnormality is seen.    Gas density along the vagina is likely related to a tampon.    No evidence of ascites or bowel obstruction.  No mass or lymphadenopathy.    Visualized portions of the appendix are unremarkable.    IUD is present.  This appears appropriately positioned.    No definite gallbladder abnormality or biliary dilatation or acute pancreatic abnormality is seen.  Suboptimal evaluation due to lack of contrast and due to paucity of abdominal fat.                  MDM      Pulse Ox: 99%, Normal, RA    Cardiac Monitor:   Pulse Readings from Last 1 Encounters:   09/18/24 70   , sinus, normal for rate and      Prior radiology reviewed: n/a    Radiology findings:     CT ABDOMEN+PELVIS(CPT=74176)    Result Date: 9/17/2024  CONCLUSION:  1. No acute intra-abdominal process is identified by noncontrast CT technique. The etiology of the patient's symptoms is unclear from this study.  2. No evidence of nephroureterolithiasis or obstructive uropathy.  3. Hepatomegaly with suspected underlying hepatic steatosis.  4. An intrauterine contraceptive device is present.  5. Lesser incidental findings as above.    A preliminary report was issued by the [facility] teleradiology service. There are no major discrepancies.   Dictated by (CST): Js Castellanos MD on 9/17/2024 at 10:27 AM     Finalized by (CST): Js Castellanos MD on 9/17/2024 at 10:31 AM          US GALLBLADDER (CPT=76705)    Result Date: 9/17/2024  CONCLUSION:  1. Negative for sonographic evidence of cholelithiasis or acute cholecystitis.  2. Negative for hepatobiliary dilatation.  3. Hepatomegaly  with sonographic features of hepatic steatosis.    Dictated by (CST): Js Castellanos MD on 9/17/2024 at 7:07 AM     Finalized by (CST): Js Castellanos MD on 9/17/2024 at 7:09 AM               Medications   ondansetron (Zofran) 4 MG/2ML injection 4 mg (4 mg Intravenous Given 9/17/24 0000)   morphINE PF 2 MG/ML injection 2 mg (2 mg Intravenous Given 9/17/24 0008)   ketorolac (Toradol) 15 MG/ML injection 15 mg (15 mg Intravenous Given 9/17/24 0111)   mylanta-dicyclomine-lidocaine 2% (G.I. Cocktail) oral liquid ( Oral Given 9/17/24 0340)     Pt with some improvement after medication, but still feeling some discomfort RUQ. Will treat for pt constipation feeling. There is no obvious source for RUQ pain on US Abd or CT Abd/Pel, VS stable with no fever. Labs reassuring. Pt advised of supportive care, f/u, and reasons to return.            Disposition and Plan     Clinical Impression:  1. Abdominal pain, acute         Disposition:  Discharge  9/17/2024  4:04 am    Follow-up:  Jina Buenrostro DO  [address] [place] [id number]  [phone]    Schedule an appointment as soon as possible for a visit  Call for next available appointment, If you need a new primary care provider          Medications Prescribed:  Discharge Medication List as of 9/17/2024  4:18 AM        START taking these medications    Details   famotidine (PEPCID) 20 MG Oral Tab Take 1 tablet (20 mg total) by mouth 2 (two) times daily as needed for Heartburn., Normal, Disp-30 tablet, R-0      ondansetron 4 MG Oral Tablet Dispersible Take 1 tablet (4 mg total) by mouth every 8 (eight) hours as needed for Nausea., Normal, Disp-10 tablet, R-0      docusate sodium 100 MG Oral Cap Take 1 capsule (100 mg total) by mouth 2 (two) times daily as needed for constipation., Normal, Disp-60 capsule, R-0      Magnesium Citrate Oral Solution Take 296 mL by mouth once as needed (constipation)., Normal, Disp-296 mL, R-0

## 2024-09-17 NOTE — ED QUICK NOTES
Pt presents for evaluation of abdominal pain.  Pt states that the pain has been intermittent but has worsened since waiting in the ED at [facility] for the past few hours.  Pt's pain is to the right side.  Denies any vaginal discharge. Pt had urinary symptoms last week but she states those have resolved.

## 2024-09-17 NOTE — ED INITIAL ASSESSMENT (HPI)
Pt presents to the ED with c/o RLQ abdominal pain that radiates upwards to her chest. +chest tightness for four days. +nausea w/o emesis. +constipation

## 2024-09-18 ENCOUNTER — OFFICE VISIT (OUTPATIENT)
Dept: FAMILY MEDICINE CLINIC | Facility: CLINIC | Age: 24
End: 2024-09-18

## 2024-09-18 VITALS
SYSTOLIC BLOOD PRESSURE: 118 MMHG | BODY MASS INDEX: 26.2 KG/M2 | HEART RATE: 70 BPM | HEIGHT: 66 IN | DIASTOLIC BLOOD PRESSURE: 73 MMHG | OXYGEN SATURATION: 98 % | WEIGHT: 163 LBS

## 2024-09-18 DIAGNOSIS — F41.9 ANXIETY AND DEPRESSION: ICD-10-CM

## 2024-09-18 DIAGNOSIS — F32.A ANXIETY AND DEPRESSION: ICD-10-CM

## 2024-09-18 DIAGNOSIS — Z11.3 SCREENING FOR STD (SEXUALLY TRANSMITTED DISEASE): ICD-10-CM

## 2024-09-18 DIAGNOSIS — R10.32 LEFT LOWER QUADRANT ABDOMINAL PAIN: Primary | ICD-10-CM

## 2024-09-18 RX ORDER — CEPHALEXIN 500 MG/1
CAPSULE ORAL
COMMUNITY

## 2024-09-18 RX ORDER — COPPER 313.4 MG/1
INTRAUTERINE DEVICE INTRAUTERINE
COMMUNITY
Start: 2022-10-19

## 2024-09-18 RX ORDER — CIPROFLOXACIN 500 MG/1
TABLET, FILM COATED ORAL
COMMUNITY

## 2024-09-18 RX ORDER — CEFADROXIL 500 MG/1
CAPSULE ORAL
COMMUNITY

## 2024-09-18 NOTE — PROGRESS NOTES
Subjective:   Yarelis Groves is a 24 year old female who presents for ER F/U (Pt was in the Er on 9/16 for abdominal pain and pt reports she is still having pain and was not told what was wrong at the hospital. ).  Abdominal Pain  This is a new problem. The current episode started in the past 7 days. The onset quality is sudden. The problem occurs constantly. The problem has been gradually worsening. The pain is located in the RUQ and RLQ. The pain is at a severity of 8/10. The pain is severe. The quality of the pain is sharp. The abdominal pain does not radiate. Associated symptoms include flatus. Pertinent negatives include no anorexia, constipation, diarrhea, dysuria, fever, frequency, headaches, myalgias, nausea, vomiting or weight loss. The pain is aggravated by eating. The pain is relieved by Nothing. She has tried nothing for the symptoms. Prior diagnostic workup includes ultrasound and CT scan. There is no history of abdominal surgery, Crohn's disease, gallstones, GERD, irritable bowel syndrome or ulcerative colitis.   Anxiety  This is a recurrent problem. The current episode started more than 1 year ago. The problem occurs every several days. The problem has been gradually worsening. Associated symptoms include abdominal pain. Pertinent negatives include no anorexia, chest pain, congestion, coughing, fatigue, fever, headaches, joint swelling, myalgias, nausea, rash or vomiting. The symptoms are aggravated by stress. She has tried nothing for the symptoms.     Patient was in the ER 2 days for abdominal pain and had blood work, ultrasound, and CT done which were negative. She was prescribed famotidine, ondansetron, docusate and magnesium citrate, which she has not picked up from the pharmacy.         History/Other:      Chief Complaint Reviewed and Verified  Nursing Notes Reviewed and   Verified  Tobacco Reviewed  Allergies Reviewed  Medications Reviewed    Problem List Reviewed  Medical History  Reviewed  Surgical History   Reviewed  OB Status Reviewed  Family History Reviewed  Social History   Reviewed           Tobacco:  She smoked tobacco in the past but quit greater than 12 months ago.  Social History     Tobacco Use   Smoking Status Former    Current packs/day: 0.00    Types: Cigarettes    Quit date: 2019    Years since quittin.8   Smokeless Tobacco Never          Current Outpatient Medications   Medication Sig Dispense Refill    intrauterine copper contraceptive Intrauterine IUD Take 1 device by intrauterine route. (Patient not taking: Reported on 2024)      ciprofloxacin 500 MG Oral Tab  (Patient not taking: Reported on 2024)      cephALEXin 500 MG Oral Cap  (Patient not taking: Reported on 2024)      cefadroxil 500 MG Oral Cap  (Patient not taking: Reported on 2024)      famotidine (PEPCID) 20 MG Oral Tab Take 1 tablet (20 mg total) by mouth 2 (two) times daily as needed for Heartburn. (Patient not taking: Reported on 2024) 30 tablet 0    ondansetron 4 MG Oral Tablet Dispersible Take 1 tablet (4 mg total) by mouth every 8 (eight) hours as needed for Nausea. (Patient not taking: Reported on 2024) 10 tablet 0    docusate sodium 100 MG Oral Cap Take 1 capsule (100 mg total) by mouth 2 (two) times daily as needed for constipation. (Patient not taking: Reported on 2024) 60 capsule 0       No Known Allergies    Depression Screening (PHQ-2/PHQ-9): Over the LAST 2 WEEKS   Little interest or pleasure in doing things (over the last two weeks)?: Not at all    Feeling down, depressed, or hopeless (over the last two weeks)?: Not at all    PHQ-2 SCORE: 0           Review of Systems   Constitutional: Negative.  Negative for activity change, fatigue, fever and weight loss.   HENT: Negative.  Negative for congestion, ear pain, rhinorrhea and sneezing.    Eyes: Negative.  Negative for redness.   Respiratory: Negative.  Negative for cough, shortness of breath and  wheezing.    Cardiovascular: Negative.  Negative for chest pain.   Gastrointestinal:  Positive for abdominal pain and flatus. Negative for anorexia, constipation, diarrhea, nausea and vomiting.   Endocrine: Negative.    Genitourinary: Negative.  Negative for difficulty urinating, dysuria and frequency.   Musculoskeletal: Negative.  Negative for back pain, joint swelling and myalgias.   Skin: Negative.  Negative for rash.   Allergic/Immunologic: Negative.    Neurological: Negative.  Negative for dizziness, syncope, light-headedness and headaches.   Hematological: Negative.    Psychiatric/Behavioral: Negative.           Objective:   /73 (BP Location: Right arm, Patient Position: Sitting, Cuff Size: adult)   Pulse 70   Ht 5' 6\" (1.676 m)   Wt 163 lb (73.9 kg)   SpO2 98%   BMI 26.31 kg/m²  Estimated body mass index is 26.31 kg/m² as calculated from the following:    Height as of this encounter: 5' 6\" (1.676 m).    Weight as of this encounter: 163 lb (73.9 kg).      Physical Exam  Vitals and nursing note reviewed.   Constitutional:       Appearance: Normal appearance. She is normal weight.   HENT:      Head: Normocephalic and atraumatic.      Right Ear: Tympanic membrane normal.      Left Ear: Tympanic membrane normal.      Nose: Nose normal.      Mouth/Throat:      Mouth: Mucous membranes are moist.      Pharynx: Oropharynx is clear.   Eyes:      Extraocular Movements: Extraocular movements intact.      Conjunctiva/sclera: Conjunctivae normal.      Pupils: Pupils are equal, round, and reactive to light.   Cardiovascular:      Rate and Rhythm: Normal rate and regular rhythm.      Pulses: Normal pulses.      Heart sounds: Normal heart sounds.   Pulmonary:      Effort: Pulmonary effort is normal.      Breath sounds: Normal breath sounds.   Abdominal:      General: Abdomen is flat. Bowel sounds are normal.      Palpations: Abdomen is soft.   Musculoskeletal:         General: Normal range of motion.      Cervical  back: Normal range of motion and neck supple.   Skin:     General: Skin is warm and dry.   Neurological:      General: No focal deficit present.      Mental Status: She is alert and oriented to person, place, and time. Mental status is at baseline.   Psychiatric:         Mood and Affect: Mood normal.         Behavior: Behavior normal.         Thought Content: Thought content normal.         Judgment: Judgment normal.           Assessment & Plan:     1. Left lower quadrant abdominal pain  - Hepatitis Panel, Acute (4)    2. Anxiety and depression  - [facility] Referral - In Network    3. Screening for STD (sexually transmitted disease)  - Chlamydia/Gc Amplification  - Hepatitis Panel, Acute (4)  - HIV Ag/Ab Combo  - HSV 1 & 2 Glycoprotein Specific AB,IGG; Future  - T Pallidum Screening Cascade         Medication use, effects and side effects discussed in detail with patient. The patient indicated understanding of the diagnosis and agreed with the plan of care.    Return in about 2 weeks (around 10/2/2024) for Annual Physical .    YANELY Gusman

## 2024-09-23 ENCOUNTER — TELEPHONE (OUTPATIENT)
Age: 24
End: 2024-09-23

## 2024-09-23 NOTE — TELEPHONE ENCOUNTER
Hello - I am reaching out from the [facility] Navigation department, following up on an order from your provider's office to assist in connecting you with resources for care. If you would like to discuss this further, please give us a call at [phone], or for more immediate assistance you can contact our 24-hour help line at [phone]. We look forward to hearing from you soon.

## 2024-10-03 LAB
CHLAMYDIA TRACHOMATIS$RNA, TMA: NOT DETECTED
NEISSERIA GONORRHOEAE$RNA, TMA: NOT DETECTED
T. PALLIDUM AB, EIA: NEGATIVE

## 2024-10-09 ENCOUNTER — TELEPHONE (OUTPATIENT)
Age: 24
End: 2024-10-09

## 2024-11-24 ENCOUNTER — HOSPITAL ENCOUNTER (OUTPATIENT)
Age: 24
Discharge: HOME | End: 2024-11-24
Payer: MEDICAID

## 2024-11-24 VITALS
OXYGEN SATURATION: 98 % | RESPIRATION RATE: 16 BRPM | HEART RATE: 75 BPM | TEMPERATURE: 99 F | SYSTOLIC BLOOD PRESSURE: 136 MMHG | DIASTOLIC BLOOD PRESSURE: 76 MMHG

## 2024-11-24 DIAGNOSIS — S01.01XA LACERATION OF SCALP, INITIAL ENCOUNTER: Primary | ICD-10-CM

## 2024-11-24 NOTE — DISCHARGE INSTRUCTIONS
As discussed, please do not get glue wet for at least 24 hours.  After 24 hours, showering okay.  No prolonged exposure to water: No tub baths or swimming.  You may take Tylenol and Motrin for any discomfort.  Apply ice 4 times a day for least 15 minutes.  Please monitor for signs symptoms of infection: Pain, swelling, redness, drainage, discharge.

## 2024-11-24 NOTE — ED PROVIDER NOTES
Patient Seen in: [facility]      History     Chief Complaint   Patient presents with    Laceration/Abrasion     Stated Complaint: Head Injury    Subjective: This is a 24-year-old female without significant past medical history, presents to immediate care clinic for evaluation of laceration to scalp.  Patient states a lamp fell from about 3 to 4 feet from a filing cabinet on top of her head about 40 minutes prior to arrival.  No LOC.  She states she was initially nauseous after head injury, currently without nausea or vomiting.  No photophobia or phonophobia.  Scalp pain without discernible headache, dizziness, lightheadedness.  Patient requesting glue and not stitches or staples for closure.  Controlled on arrival.  Ambulatory on arrival with steady gait.  No assistance.  AOx4  The history is provided by the patient.             Objective:     Past Medical History:    Preeclampsia (HCC)              Past Surgical History:   Procedure Laterality Date                      Social History     Socioeconomic History    Marital status: Single   Tobacco Use    Smoking status: Former     Current packs/day: 0.00     Types: Cigarettes     Quit date: 2019     Years since quittin.0    Smokeless tobacco: Never   Vaping Use    Vaping status: Never Used   Substance and Sexual Activity    Alcohol use: Yes     Comment: OCC    Drug use: Yes     Types: Cannabis   Other Topics Concern    Caffeine Concern No     Social Drivers of Health      [facility], [facility]    Housing Stability              Review of Systems   Constitutional: Negative.    Eyes: Negative.  Negative for photophobia.   Respiratory: Negative.     Cardiovascular: Negative.    Gastrointestinal:  Positive for nausea. Negative for abdominal pain, rectal pain and vomiting.   Musculoskeletal:  Negative for neck pain and neck stiffness.   Skin:  Positive for wound.   Neurological:  Negative.  Negative for dizziness, weakness, light-headedness and headaches.       Positive for stated complaint: Head Injury  Other systems are as noted in HPI.  Constitutional and vital signs reviewed.      All other systems reviewed and negative except as noted above.    Physical Exam     ED Triage Vitals [11/24/24 1325]   /76   Pulse 75   Resp 16   Temp 98.5 °F (36.9 °C)   Temp src Temporal   SpO2 98 %   O2 Device None (Room air)       Current Vitals:   Vital Signs  BP: 136/76  Pulse: 75  Resp: 16  Temp: 98.5 °F (36.9 °C)  Temp src: Temporal    Oxygen Therapy  SpO2: 98 %  O2 Device: None (Room air)        Physical Exam  Constitutional:       General: She is not in acute distress.     Appearance: Normal appearance. She is not ill-appearing or toxic-appearing.   HENT:      Head: Laceration present.        Right Ear: External ear normal.      Left Ear: External ear normal.      Nose: Nose normal.      Mouth/Throat:      Mouth: Mucous membranes are moist.   Eyes:      Extraocular Movements: Extraocular movements intact.      Pupils: Pupils are equal, round, and reactive to light.   Cardiovascular:      Rate and Rhythm: Normal rate.      Pulses: Normal pulses.   Pulmonary:      Effort: Pulmonary effort is normal.   Musculoskeletal:         General: Normal range of motion.      Cervical back: Normal range of motion. No tenderness.   Skin:     General: Skin is warm.      Capillary Refill: Capillary refill takes less than 2 seconds.   Neurological:      General: No focal deficit present.      Mental Status: She is alert and oriented to person, place, and time.             ED Course   Labs Reviewed - No data to display                MDM    Differentials considered include: Superficial laceration, abrasion, laceration.    Patient neurologically intact.  No focal neurodeficits.  She is not displaying or expressing any postconcussive symptoms.    Patient has very superficial linear laceration to scalp.  It is about  1.5 cm or less.  The skin approximates well.  Patient requesting skin glue.  I do think this is a reasonable request as laceration is not deep enough to require stitches or sutures.    Skin glue placed with good skin approximation.  Patient is aware to avoid getting wet for at least 24 hours.  She is aware to brush carefully over this area.  Educated patient on signs symptoms that warrant reevaluation.    Patient without head, neck, back pain.  Neurologically intact.  Equal strength of bilateral upper and lower extremities.  No focal neurodeficits.  No concussive signs or symptoms.    Patient is aware of Tylenol, Motrin, ice application.  Patient verbalized understanding agrees with plan of care.        Medical Decision Making      Disposition and Plan     Clinical Impression:  1. Laceration of scalp, initial encounter         Disposition:  Discharge  11/24/2024  1:48 pm    Follow-up:  Nonstaff, Physician                Medications Prescribed:  Current Discharge Medication List              Supplementary Documentation:                                                            No

## 2024-12-17 ENCOUNTER — HOSPITAL ENCOUNTER (OUTPATIENT)
Age: 24
Discharge: HOME | End: 2024-12-17
Payer: MEDICAID

## 2024-12-17 ENCOUNTER — APPOINTMENT (OUTPATIENT)
Dept: GENERAL RADIOLOGY | Age: 24
End: 2024-12-17
Attending: PHYSICIAN ASSISTANT
Payer: MEDICAID

## 2024-12-17 ENCOUNTER — APPOINTMENT (OUTPATIENT)
Dept: CT IMAGING | Age: 24
End: 2024-12-17
Attending: PHYSICIAN ASSISTANT
Payer: MEDICAID

## 2024-12-17 VITALS
SYSTOLIC BLOOD PRESSURE: 119 MMHG | OXYGEN SATURATION: 100 % | DIASTOLIC BLOOD PRESSURE: 71 MMHG | TEMPERATURE: 98 F | HEART RATE: 78 BPM | RESPIRATION RATE: 17 BRPM

## 2024-12-17 DIAGNOSIS — R11.2 NAUSEA AND VOMITING IN ADULT: ICD-10-CM

## 2024-12-17 DIAGNOSIS — R10.30 LOWER ABDOMINAL PAIN: Primary | ICD-10-CM

## 2024-12-17 DIAGNOSIS — M54.2 NECK PAIN: ICD-10-CM

## 2024-12-17 LAB
#MXD IC: 0.5 X10ˆ3/UL (ref 0.1–1)
B-HCG UR QL: NEGATIVE
BILIRUB UR QL STRIP: NEGATIVE
BUN BLD-MCNC: 6 MG/DL (ref 7–18)
CHLORIDE BLD-SCNC: 105 MMOL/L (ref 98–112)
CLARITY UR: CLEAR
CO2 BLD-SCNC: 25 MMOL/L (ref 21–32)
COLOR UR: YELLOW
CREAT BLD-MCNC: 0.8 MG/DL
EGFRCR SERPLBLD CKD-EPI 2021: 105 ML/MIN/1.73M2 (ref 60–?)
GLUCOSE BLD-MCNC: 96 MG/DL (ref 70–99)
GLUCOSE UR STRIP-MCNC: NEGATIVE MG/DL
HCT VFR BLD AUTO: 40.6 %
HCT VFR BLD CALC: 43 %
HGB BLD-MCNC: 13.2 G/DL
HGB UR QL STRIP: NEGATIVE
ISTAT IONIZED CALCIUM FOR CHEM 8: 1.12 MMOL/L (ref 1.12–1.32)
KETONES UR STRIP-MCNC: NEGATIVE MG/DL
LEUKOCYTE ESTERASE UR QL STRIP: NEGATIVE
LYMPHOCYTES # BLD AUTO: 0.5 X10ˆ3/UL (ref 1–4)
LYMPHOCYTES NFR BLD AUTO: 7.6 %
MCH RBC QN AUTO: 29.7 PG (ref 26–34)
MCHC RBC AUTO-ENTMCNC: 32.5 G/DL (ref 31–37)
MCV RBC AUTO: 91.4 FL (ref 80–100)
MIXED CELL %: 6.8 %
NEUTROPHILS # BLD AUTO: 6.1 X10ˆ3/UL (ref 1.5–7.7)
NEUTROPHILS NFR BLD AUTO: 85.6 %
NITRITE UR QL STRIP: NEGATIVE
PH UR STRIP: 7.5 [PH]
PLATELET # BLD AUTO: 190 X10ˆ3/UL (ref 150–450)
POCT INFLUENZA A: NEGATIVE
POCT INFLUENZA B: NEGATIVE
POTASSIUM BLD-SCNC: 3.8 MMOL/L (ref 3.6–5.1)
PROT UR STRIP-MCNC: NEGATIVE MG/DL
RBC # BLD AUTO: 4.44 X10ˆ6/UL
SARS-COV-2 RNA RESP QL NAA+PROBE: NOT DETECTED
SODIUM BLD-SCNC: 139 MMOL/L (ref 136–145)
SP GR UR STRIP: 1.02
UROBILINOGEN UR STRIP-ACNC: <2 MG/DL
WBC # BLD AUTO: 7.1 X10ˆ3/UL (ref 4–11)

## 2024-12-17 PROCEDURE — 99214 OFFICE O/P EST MOD 30 MIN: CPT

## 2024-12-17 PROCEDURE — 81002 URINALYSIS NONAUTO W/O SCOPE: CPT

## 2024-12-17 PROCEDURE — 85025 COMPLETE CBC W/AUTO DIFF WBC: CPT | Performed by: PHYSICIAN ASSISTANT

## 2024-12-17 PROCEDURE — 81025 URINE PREGNANCY TEST: CPT

## 2024-12-17 PROCEDURE — 96374 THER/PROPH/DIAG INJ IV PUSH: CPT

## 2024-12-17 PROCEDURE — 87502 INFLUENZA DNA AMP PROBE: CPT | Performed by: PHYSICIAN ASSISTANT

## 2024-12-17 PROCEDURE — 72040 X-RAY EXAM NECK SPINE 2-3 VW: CPT | Performed by: PHYSICIAN ASSISTANT

## 2024-12-17 PROCEDURE — 80047 BASIC METABLC PNL IONIZED CA: CPT

## 2024-12-17 PROCEDURE — 74177 CT ABD & PELVIS W/CONTRAST: CPT | Performed by: PHYSICIAN ASSISTANT

## 2024-12-17 RX ORDER — LIDOCAINE 50 MG/G
1 PATCH TOPICAL EVERY 24 HOURS
Qty: 5 PATCH | Refills: 0 | Status: SHIPPED | OUTPATIENT
Start: 2024-12-17 | End: 2024-12-22

## 2024-12-17 RX ORDER — IBUPROFEN 600 MG/1
TABLET, FILM COATED ORAL
Qty: 20 TABLET | Refills: 0 | Status: SHIPPED | OUTPATIENT
Start: 2024-12-17

## 2024-12-17 RX ORDER — CYCLOBENZAPRINE HCL 10 MG
10 TABLET ORAL 3 TIMES DAILY PRN
Qty: 14 TABLET | Refills: 0 | Status: SHIPPED | OUTPATIENT
Start: 2024-12-17 | End: 2024-12-24

## 2024-12-17 RX ORDER — ONDANSETRON 4 MG/1
4 TABLET, ORALLY DISINTEGRATING ORAL EVERY 8 HOURS PRN
Qty: 10 TABLET | Refills: 0 | Status: SHIPPED | OUTPATIENT
Start: 2024-12-17 | End: 2024-12-20

## 2024-12-17 RX ORDER — ONDANSETRON 2 MG/ML
4 INJECTION INTRAMUSCULAR; INTRAVENOUS ONCE
Status: COMPLETED | OUTPATIENT
Start: 2024-12-17 | End: 2024-12-17

## 2024-12-17 NOTE — ED INITIAL ASSESSMENT (HPI)
Pt complaining of abdominal pain since Saturday with nausea. Today she had episodes of vomiting. Pt also states she has had increased frequency of stool but says it is not diarrhea. Pt also complaining of runny nose. Denies fever. Pt also states she has bilateral neck pain since she is a child that she has never brought up to a doctor.

## 2024-12-17 NOTE — ED PROVIDER NOTES
Patient Seen in: [facility]    History     Chief Complaint   Patient presents with    Abdomen/Flank Pain     Stated Complaint: Abdomial, Neck Pain    HPI    Yarelis Groves is a 24 year old female who presents with chief complaint of bilateral lower abdominal pain.  Onset 3 days ago.  Patient reports associated nausea and vomiting.  Patient reports associated increased frequency of stools.  Patient states stools are nonbloody.  Patient reports associated clear rhinorrhea.  Patient further states that she has been experiencing bilateral neck pain since she was a child, and states pain has been more frequent recently.  Patient denies injury or trauma.  Patient denies fever, chills, diarrhea, constipation, melena, hematochezia, dysuria, hematuria, flank pain, vaginal bleeding, vaginal discharge, loss of consciousness, altered mental status, amnesia, weakness, paresthesias, vision changes, decreased range of motion of extremities, hematuria, saddle anesthesia, bowel/bladder incontinence.        Past Medical History:    Preeclampsia (HCC)       Past Surgical History:   Procedure Laterality Date                  Family History   Problem Relation Age of Onset    Diabetes Other         Family h/o Diabetes    Blindness Other         Family h/o Blindness       Social History     Socioeconomic History    Marital status: Single   Tobacco Use    Smoking status: Former     Current packs/day: 0.00     Types: Cigarettes     Quit date: 2019     Years since quittin.0    Smokeless tobacco: Never   Vaping Use    Vaping status: Never Used   Substance and Sexual Activity    Alcohol use: Yes     Comment: OCC    Drug use: Yes     Types: Cannabis   Other Topics Concern    Caffeine Concern No     Social Drivers of Health      [facility], [facility]    Housing Stability       Review of Systems    Positive for stated complaint: Abdomial, Neck Pain  Other  systems are as noted in HPI.  Constitutional and vital signs reviewed.      All other systems reviewed and negative except as noted above.    PSFH elements reviewed from today and agreed except as otherwise stated in HPI.    Physical Exam     ED Triage Vitals [12/17/24 1040]   /71   Pulse 78   Resp 17   Temp 98.1 °F (36.7 °C)   Temp src Oral   SpO2 100 %   O2 Device None (Room air)       Current:/71   Pulse 78   Temp 98.1 °F (36.7 °C) (Oral)   Resp 17   SpO2 100%     PULSE OX within normal limits on room air as interpreted by this provider.        Physical Exam    Constitutional: The patient is cooperative. Appears well-developed and well-nourished.  Mild discomfort.  Psychological: Alert, No abnormalities of mood, affect.  Head: Normocephalic/atraumatic.  Eyes: Pupils are equal round reactive to light.  Conjunctiva are within normal limits.  ENT: Oropharynx is clear.  Mucous membranes moist.  Neck: The neck is supple. No meningeal signs. Trachea normal.  Positive tenderness to palpation present at bilateral cervical paraspinal muscles.  No vertebral point tenderness.  No contusions. No abrasions. No penetrating injury.  Chest: There is no tenderness to the chest wall.  No CVA tenderness bilaterally.  Respiratory: Respiratory effort was normal.  There is no stridor.  Air entry is equal.  Cardiovascular: Regular rate and rhythm.  Capillary refill is brisk.    Gastrointestinal: Positive tenderness palpation present at left lower quadrant and right lower quadrant.  Abdomen soft, nondistended.  There is no rebound tenderness or guarding.  No organomegaly is noted. No peritoneal signs.  Normal bowel sounds.  Genitourinary: Not examined.  Lymphatic: No gross lymphadenopathy noted.  Musculoskeletal: Musculoskeletal system is grossly intact.  There is no obvious deformity.  Neurological: Gross motor movement is intact in all 4 extremities.  Patient exhibits normal speech.  Skin: Skin is normal to inspection.   Warm and dry.  No obvious bruising.  No obvious rash.          ED Course     Labs Reviewed   POCT CBC - Abnormal; Notable for the following components:       Result Value    # Lymphocyte 0.5 (*)     All other components within normal limits   POCT ISTAT CHEM8 CARTRIDGE - Abnormal; Notable for the following components:    ISTAT BUN 6 (*)     All other components within normal limits   POCT PREGNANCY URINE - Normal   POCT FLU TEST - Normal    Narrative:     This assay is a rapid molecular in vitro test utilizing nucleic acid amplification of influenza A and B viral RNA.   RAPID SARS-COV-2 BY PCR - Normal   [facility] POCT URINALYSIS DIPSTICK       MDM             Radiology Interpretation:  CT ABDOMEN+PELVIS(CONTRAST ONLY)(CPT=74177)    Result Date: 12/17/2024  CONCLUSION:   No acute abnormality in the abdomen or pelvis.  No finding to explain abdominal pain or nausea/vomiting.  No bowel obstruction.  The appendix is not seen but there are no right lower quadrant inflammatory changes.  No renal or ureteral stones or hydronephrosis.  No gallstones or biliary ductal dilatation.  Additional chronic or incidental findings are described in the body of this report.     elm-remote   Dictated by (CST): Sunny Styles MD on 12/17/2024 at 11:44 AM     Finalized by (CST): Sunny Styles MD on 12/17/2024 at 11:49 AM          XR CERVICAL SPINE (2-3 VIEWS) (CPT=72040)    Result Date: 12/17/2024  CONCLUSION: No acute fracture or malalignment of the cervical spine.  The odontoid process is intact.  Minimal questionable degenerative changes.  Lungs and soft tissues are unremarkable.    elm-remote      Dictated by (CST): Sunny Styles MD on 12/17/2024 at 11:07 AM     Finalized by (CST): Sunny Styles MD on 12/17/2024 at 11:08 AM           Medical Decision Making  Differential diagnosis prior to work-up including but not limited to biliary colic, pancreatitis, gastritis, enteritis, colitis, diverticulitis, appendicitis, perforated viscus, bowel  obstruction, UTI, urolithiasis, fracture, sprain/strain, contusion, arthritis    Problems Addressed:  Lower abdominal pain: acute illness or injury  Nausea and vomiting in adult: acute illness or injury  Neck pain: acute illness or injury    Amount and/or Complexity of Data Reviewed  Labs: ordered. Decision-making details documented in ED Course.     Details: COVID-19 negative.  Influenza negative.  Radiology: ordered. Decision-making details documented in ED Course.    Risk  Prescription drug management.      Physical exam remained stable as previously documented.  Available results reviewed with patient.    I have given the patient instructions regarding their diagnoses, expectations, follow up, and ER precautions. I explained to the patient that emergent conditions may arise and to go to the ER for new, worsening or any persistent conditions. I've explained the importance of following up with their doctor as instructed. The patient verbalized understanding of the discharge instructions and plan.          Disposition and Plan     Clinical Impression:  1. Lower abdominal pain    2. Nausea and vomiting in adult    3. Neck pain        Disposition:  Discharge    Follow-up:  Elma Reyez APRN  [address] [facility] [id number] [phone]    Call in 1 day  For follow-up    Manuel Rivas Y,   [address]  [place] [id number] [phone]    Call in 1 day  For follow-up      Medications Prescribed:  Discharge Medication List as of 12/17/2024 11:59 AM        START taking these medications    Details   cyclobenzaprine 10 MG Oral Tab Take 1 tablet (10 mg total) by mouth 3 (three) times daily as needed for Muscle spasms., Normal, Disp-14 tablet, R-0      ibuprofen 600 MG Oral Tab Take 1 tablet (600 mg total) by mouth every 6 hours with food, Normal, Disp-20 tablet, R-0      lidocaine 5 % External Patch Place 1 patch onto the skin daily for 5 days., Normal, Disp-5 patch, R-0

## 2025-02-04 ENCOUNTER — OFFICE VISIT (OUTPATIENT)
Dept: OBGYN CLINIC | Facility: CLINIC | Age: 25
End: 2025-02-04

## 2025-02-04 VITALS
BODY MASS INDEX: 29 KG/M2 | SYSTOLIC BLOOD PRESSURE: 119 MMHG | DIASTOLIC BLOOD PRESSURE: 76 MMHG | WEIGHT: 180 LBS | HEART RATE: 88 BPM

## 2025-02-04 DIAGNOSIS — T83.32XA INTRAUTERINE CONTRACEPTIVE DEVICE THREADS LOST, INITIAL ENCOUNTER: ICD-10-CM

## 2025-02-04 DIAGNOSIS — N89.8 VAGINAL ODOR: Primary | ICD-10-CM

## 2025-02-04 DIAGNOSIS — Z11.3 SCREENING EXAMINATION FOR STI: ICD-10-CM

## 2025-02-04 DIAGNOSIS — Z12.4 CERVICAL CANCER SCREENING: ICD-10-CM

## 2025-02-04 DIAGNOSIS — R39.9 UTI SYMPTOMS: ICD-10-CM

## 2025-02-04 LAB
APPEARANCE: CLEAR
BILIRUBIN: NEGATIVE
GLUCOSE (URINE DIPSTICK): NEGATIVE MG/DL
LEUKOCYTES: NEGATIVE
MULTISTIX LOT#: ABNORMAL NUMERIC
NITRITE, URINE: NEGATIVE
PH, URINE: 6 (ref 4.5–8)
PROTEIN (URINE DIPSTICK): NEGATIVE MG/DL
SPECIFIC GRAVITY: 1.03 (ref 1–1.03)
URINE-COLOR: YELLOW
UROBILINOGEN,SEMI-QN: 0.2 MG/DL (ref 0–1.9)

## 2025-02-04 PROCEDURE — 99213 OFFICE O/P EST LOW 20 MIN: CPT | Performed by: ADVANCED PRACTICE MIDWIFE

## 2025-02-04 PROCEDURE — 81002 URINALYSIS NONAUTO W/O SCOPE: CPT | Performed by: ADVANCED PRACTICE MIDWIFE

## 2025-02-04 NOTE — PROGRESS NOTES
Chief Complaint   Patient presents with    Gyn Problem     VAGINAL ODOR      HPI:   Yarelis is 24 year old , here today with concern for UTI symptoms. For the past few days has had urgency, frequency, burning with urination and vaginal odor. Has had BV and UTIs in the past. Has not been sexually active for a few months. Has had mIUD for contraception for the past 2-3 years. Had daily spotting with the IUD up until a few months ago when the spotting stopped. Now gets a light period monthly.    Pt offered for MA to be present during exam and patient declined    Patient Active Problem List   Diagnosis    Elevated blood pressure affecting pregnancy in third trimester, antepartum (HCC)    Pre-eclampsia in third trimester (HCC)    Breech presentation, no version (HCC)    Preeclampsia (HCC)    Post-operative state    COVID-19        Note: This is a gyn only visit. Pt has PCP and is referred back to PCP for care of any non-gyn concerns listed above in the Problem List.    Medications (Active prior to today's visit):  Current Outpatient Medications   Medication Sig Dispense Refill    intrauterine copper contraceptive Intrauterine IUD Take 1 device by intrauterine route. (Patient not taking: Reported on 2024)         Allergies:  Allergies[1]    ROS:  Review of Systems   Constitutional: Negative.    Respiratory: Negative.     Cardiovascular: Negative.    Gastrointestinal: Negative.    Genitourinary:  Positive for dysuria, frequency and urgency. Negative for difficulty urinating, dyspareunia, genital sores, hematuria, menstrual problem, pelvic pain, vaginal bleeding, vaginal discharge and vaginal pain.        Vaginal odor   Neurological: Negative.    Psychiatric/Behavioral: Negative.         PHYSICAL EXAM:  Vitals:    25 1716   BP: 119/76   Pulse: 88   Body mass index is 29.05 kg/m².    Physical Exam  Vitals and nursing note reviewed.   Constitutional:       General: She is not in acute distress.      Appearance: Normal appearance. She is not ill-appearing.   HENT:      Head: Normocephalic and atraumatic.   Cardiovascular:      Rate and Rhythm: Normal rate.   Pulmonary:      Effort: Pulmonary effort is normal. No respiratory distress.   Genitourinary:     General: Normal vulva.      Exam position: Lithotomy position.      Labia:         Right: No rash, tenderness, lesion or injury.         Left: No rash, tenderness, lesion or injury.       Urethra: No prolapse, urethral pain, urethral swelling or urethral lesion.      Vagina: Normal. No vaginal discharge, tenderness or lesions.      Cervix: No discharge, lesion or erythema.      Comments: IUD strings not visualized, attempted with pap brush but still not visualized.   Small amount of dark brown/red blood in vagina, no odor  Skin:     General: Skin is warm and dry.   Neurological:      Mental Status: She is alert and oriented to person, place, and time.   Psychiatric:         Mood and Affect: Mood normal.         Behavior: Behavior normal.         Thought Content: Thought content normal.         Judgment: Judgment normal.        No results found for this or any previous visit (from the past 24 hours).        ASSESSMENT/PLAN:     Yarelis was seen today for gyn problem.    Diagnoses and all orders for this visit:    Vaginal odor  -     URINALYSIS NONAUTO W/O SCOPE  -     Chlamydia/Gc Amplification  -     Trichomonas Vaginitis by NEGRITA; Future  -     Vaginitis Vaginosis PCR Panel; Future  -     Trichomonas Vaginitis by NEGRITA  -     Vaginitis Vaginosis PCR Panel    UTI symptoms  -     Urine Culture, Routine    Cervical cancer screening  -     ThinPrep PAP with HPV Reflex Request B; Future  -     ThinPrep PAP with HPV Reflex Request B  -     ThinPrep PAP with HPV Reflex Request    Screening examination for STI  -     Chlamydia/Gc Amplification  -     Trichomonas Vaginitis by NEGRITA; Future  -     Trichomonas Vaginitis by NEGRITA    Intrauterine contraceptive device threads lost,  initial encounter  -     US PELVIS (TRANSABDOMINAL AND TRANSVAGINAL) (CPT=76856/02237); Future    -- Pt instructed on strict condom use until pelvic US. Warning signs reviewed.      Follow-up/Return to clinic: After pelvic US    Counseling:   Best hygiene practices  Contraceptive method(s), STI and HIV risk reduction/condom use  Emergency contraception reviewed  Frequency of health screening and personal risks  Pap, SBE/CBE, mammography    I have spent 20 minutes total time on the day of the encounter, including: preparing to see the patient, ordering/reviewing labs, performing a medically appropriate exam, and providing care coordination. face to face counseling, chart review, orders and coordination of care    Patient verbalized understanding, All questions answered. No barriers to learning identified       [1] No Known Allergies

## 2025-02-05 ENCOUNTER — HOSPITAL ENCOUNTER (OUTPATIENT)
Dept: ULTRASOUND IMAGING | Facility: HOSPITAL | Age: 25
Discharge: HOME | End: 2025-02-05
Attending: ADVANCED PRACTICE MIDWIFE
Payer: MEDICAID

## 2025-02-05 DIAGNOSIS — T83.32XA INTRAUTERINE CONTRACEPTIVE DEVICE THREADS LOST, INITIAL ENCOUNTER: ICD-10-CM

## 2025-02-05 LAB
BV BACTERIA DNA VAG QL NAA+PROBE: NEGATIVE
C GLABRATA DNA VAG QL NAA+PROBE: NEGATIVE
C KRUSEI DNA VAG QL NAA+PROBE: NEGATIVE
C TRACH DNA SPEC QL NAA+PROBE: NEGATIVE
CANDIDA DNA VAG QL NAA+PROBE: NEGATIVE
N GONORRHOEA DNA SPEC QL NAA+PROBE: NEGATIVE
T VAGINALIS DNA VAG QL NAA+PROBE: NEGATIVE
T VAGINALIS RRNA SPEC QL NAA+PROBE: NEGATIVE

## 2025-02-05 PROCEDURE — 76830 TRANSVAGINAL US NON-OB: CPT | Performed by: ADVANCED PRACTICE MIDWIFE

## 2025-02-05 PROCEDURE — 76856 US EXAM PELVIC COMPLETE: CPT | Performed by: ADVANCED PRACTICE MIDWIFE

## 2025-02-11 ENCOUNTER — HOSPITAL ENCOUNTER (OUTPATIENT)
Age: 25
Discharge: HOME | End: 2025-02-11
Payer: MEDICAID

## 2025-02-11 ENCOUNTER — PATIENT MESSAGE (OUTPATIENT)
Dept: OBGYN CLINIC | Facility: CLINIC | Age: 25
End: 2025-02-11

## 2025-02-11 VITALS
HEART RATE: 76 BPM | OXYGEN SATURATION: 100 % | DIASTOLIC BLOOD PRESSURE: 86 MMHG | RESPIRATION RATE: 16 BRPM | TEMPERATURE: 98 F | SYSTOLIC BLOOD PRESSURE: 135 MMHG

## 2025-02-11 DIAGNOSIS — N30.10 INTERSTITIAL CYSTITIS: Primary | ICD-10-CM

## 2025-02-11 DIAGNOSIS — M54.50 ACUTE LEFT-SIDED LOW BACK PAIN WITHOUT SCIATICA: Primary | ICD-10-CM

## 2025-02-11 PROCEDURE — 81002 URINALYSIS NONAUTO W/O SCOPE: CPT

## 2025-02-11 PROCEDURE — 81025 URINE PREGNANCY TEST: CPT

## 2025-02-11 PROCEDURE — 99213 OFFICE O/P EST LOW 20 MIN: CPT

## 2025-02-11 PROCEDURE — 99212 OFFICE O/P EST SF 10 MIN: CPT

## 2025-02-11 NOTE — ED PROVIDER NOTES
Patient Seen in: [facility]      History     Chief Complaint   Patient presents with    Back Pain     Stated Complaint: Kidney stones?    Subjective:   HPI      Patient is a healthy 24-year-old female who presents to immediate care due to left lower back pain x 2 months.  Patient states that she has had urinary frequency, dysuria over the past few months, seen by her OB/GYN 1 week ago and had reassuring transvaginal ultrasound, vaginal swabs urinalysis negative for infection.  Patient was instructed by OB/GYN to come to immediate care for further evaluation of possible kidney stones.  Patient states that left lower back pain worse with palpation and range of motion.  Patient states that pain has been chronic but denies acute injury or fall.  Denies abdominal pain fever.  Denies saddle anesthesia bowel or bladder incontinence    Objective:     Past Medical History:    Preeclampsia (HCC)              Past Surgical History:   Procedure Laterality Date                      Social History     Socioeconomic History    Marital status: Single   Tobacco Use    Smoking status: Former     Current packs/day: 0.00     Types: Cigarettes     Quit date: 2019     Years since quittin.2    Smokeless tobacco: Never   Vaping Use    Vaping status: Never Used   Substance and Sexual Activity    Alcohol use: Yes     Comment: OCC    Drug use: Yes     Types: Cannabis   Other Topics Concern    Caffeine Concern No     Social Drivers of Health      Received from [facility], [facility]    Housing Stability              Review of Systems    Positive for stated complaint: Kidney stones?  Other systems are as noted in HPI.  Constitutional and vital signs reviewed.      All other systems reviewed and negative except as noted above.    Physical Exam     ED Triage Vitals [25 1124]   /86   Pulse 76   Resp 16   Temp 98.1 °F (36.7 °C)   Temp src Oral   SpO2 100 %   O2  Device None (Room air)       Current Vitals:   Vital Signs  BP: 135/86  Pulse: 76  Resp: 16  Temp: 98.1 °F (36.7 °C)  Temp src: Oral    Oxygen Therapy  SpO2: 100 %  O2 Device: None (Room air)        Physical Exam  Vital signs reviewed. Nursing note reviewed.  Constitutional: Well-developed. Well-nourished. Lying in bed, in no acute distress  HENT: Mucous membranes moist.   EYES: No scleral icterus or conjunctival injection.  NECK: Full ROM. Supple.   CARDIAC: Normal rate. Normal S1/ S2. No murmurs. 2+ distal pulses. No edema  PULM/CHEST: Clear to auscultation bilaterally. No wheezes  ABD: Soft, non-tender, non-distended. +BS. No guarding, no rebound.  BACK: No T or L spine tenderness. left lumbar paraspinal tenderness palpation  RECTAL: deferred  Extremities: Full ROM  NEURO: Awake, alert, 5/5 strength in hip flexors, knee flexion/extension, plantar/dorsiflexion bilaterally. Equal sensation in both legs. No saddle anesthesia  SKIN: Warm and dry. No rash or lesions.  PSYCH: Normal judgment. Normal affect.               MDM      Patient is a healthy 24-year-old female who presents to immediate care due to left lower back pain x 2 months.  Patient arrives with stable vitals sitting comfortably.  Physical exam showing point tenderness with patient of left paraspinal lumbar muscles.  Urinalysis negative for pregnancy, urinalysis negative for blood, leuks, unlikely UTI, less likely obstructing kidney stone.  Most likely acute on chronic lower back pain, musculoskeletal pain.  Encourage patient to follow-up with urology regarding chronic urinary symptoms.  Discussed patient differential could include interstitial cystitis.  History given by patient.  Patient agreeable to plan all questions answered.        Medical Decision Making      Disposition and Plan     Clinical Impression:  1. Acute left-sided low back pain without sciatica         Disposition:  Discharge  2/11/2025 11:44 am    Follow-up:  No follow-up provider  specified.        Medications Prescribed:  Discharge Medication List as of 2/11/2025 11:47 AM              Supplementary Documentation:

## 2025-02-11 NOTE — ED INITIAL ASSESSMENT (HPI)
Patient arrives ambulatory with c/o back pain x months. Reports she also has had urinary urgency and burning-saw obgyn last week Tuesday and had testing for that. Reports obgyn recommended her to come in and assess for kidney stones.

## 2025-02-24 ENCOUNTER — TELEPHONE (OUTPATIENT)
Dept: SURGERY | Facility: CLINIC | Age: 25
End: 2025-02-24

## 2025-02-24 NOTE — TELEPHONE ENCOUNTER
1 st  no  show  Dr Gonsalez  Urology  Interstitial cystitis  2/24/25 TE created  No answer on call  Letter  Sent

## 2025-03-10 ENCOUNTER — TELEPHONE (OUTPATIENT)
Dept: SURGERY | Facility: CLINIC | Age: 25
End: 2025-03-10

## 2025-03-10 ENCOUNTER — OFFICE VISIT (OUTPATIENT)
Dept: SURGERY | Facility: CLINIC | Age: 25
End: 2025-03-10

## 2025-03-10 DIAGNOSIS — R39.9 LOWER URINARY TRACT SYMPTOMS: Primary | ICD-10-CM

## 2025-03-10 PROCEDURE — 99204 OFFICE O/P NEW MOD 45 MIN: CPT | Performed by: UROLOGY

## 2025-03-10 NOTE — PROGRESS NOTES
Regina Gonsalez MD  Department of Urology  [address] [place] [address] Rd[address]  [place] [address]    T: [phone]  F: [phone]    To: PHYSICIAN ELLIE   No address on file    Re: Yarelis Groves   MRN: [id number]  : 2000    Dear PHYSICIAN ELLIE,    Today I had the pleasure of seeing Yarelis Groves in my clinic. As you know, Ms. Groves is a pleasant 24 year old year old female who I am seeing for LUTS. Patient was last seen in this department on Visit date not found.    Briefly, she was told by her OB/GYN (per documentation) that she might have kidney stones however CT scan in December demonstrated no kidney stones.  She did have a urine analysis that was negative, vaginitis panel that was negative, trichomonas that was negative, urine culture that was negative and chlamydia and gonorrhea that was negative.  Per my chart review does not appear that she has had recurrent urinary tract infections.  She did have 1 E. coli infection in .  She has not seen urology in our system previously.        PAST MEDICAL HISTORY:  Past Medical History:    Preeclampsia (HCC)        PAST SURGICAL HISTORY:  Past Surgical History:   Procedure Laterality Date               ALLERGIES:  Allergies[1]      MEDICATIONS:  Current Outpatient Medications   Medication Instructions    intrauterine copper contraceptive Intrauterine IUD Take 1 device by intrauterine route.        FAMILY HISTORY:  Family History   Problem Relation Age of Onset    Diabetes Other         Family h/o Diabetes    Blindness Other         Family h/o Blindness        SOCIAL HISTORY:  Social History     Socioeconomic History    Marital status: Single   Tobacco Use    Smoking status: Former     Current packs/day: 0.00     Types: Cigarettes     Quit date: 2019     Years since quittin.3    Smokeless tobacco: Never   Vaping Use    Vaping status: Never Used   Substance and Sexual Activity    Alcohol use: Yes     Comment: OCC     Drug use: Yes     Types: Cannabis   Other Topics Concern    Caffeine Concern No     Social Drivers of Health      [facility], [facility]    Housing Stability          PHYSICAL EXAMINATION:  There were no vitals filed for this visit.  CONSTITUTIONAL: No apparent distress, cooperative and communicative  NEUROLOGIC: Alert and oriented   HEAD: Normocephalic, atraumatic   EYES: Sclera non-icteric   ENT: Hearing intact, moist mucous membranes   NECK: No obvious goiter or masses   RESPIRATORY: Normal respiratory effort, Nonlabored breathing on room air  SKIN: No evident rashes   ABDOMEN:no distension or masses      REVIEW OF SYSTEMS:    A comprehensive 10-point review of systems was completed.  Pertinent positives and negatives are noted in the the HPI.       LABORATORY DATA:  POCT Urine Pregnancy  Negative Negative         Component  Ref Range & Units 2/11/25 11:21 AM   Urine Color  Yellow Yellow   Urine Clarity  Clear Clear   Specific Gravity, Urine  1.005 - 1.030 1.020   PH, Urine  5.0 - 8.0 7.0   Protein urine  Negative mg/dL Negative   Glucose, Urine  Negative mg/dL Negative   Ketone, Urine  Negative mg/dL Negative   Bilirubin, Urine  Negative Negative   Blood, Urine  Negative Negative   Nitrite Urine  Negative Negative   Urobilinogen urine  <2.0 mg/dL <2.0   Leukocyte esterase urine  Negative Negative            Component  Ref Range & Units 2/4/25  6:08 PM   Trichomonas Vaginalis NEGRITA  Negative Negative   HPV Source Cervical/endocervical   Resulting Agency [facility] ([facility])        URINE CULTURE No Growth at 18-24 hrs.        Resulting Agency: [facility] ([facility])     Ref Range & Units    Chlamydia Trachomatis Amplified RNA  Negative Negative   Neisseria Gonorrhoeae Amplified RNA  Negative Negative   Chlam/GC Source Cervical/endocervical           IMAGING REVIEW:  Narrative   PROCEDURE: US PELVIS TRANSABDOMINAL AND TRANSVAGINAL (CPT=76856/40245)     COMPARISON:  None.     INDICATIONS: IUD threads lost     TECHNIQUE: Pelvic ultrasound using transabdominal and transvaginal technique.  A transvaginal scan was performed for endometrial and adnexal evaluation     FINDINGS:  UTERUS:   Measures 9.9 x 3.7 x 7.1 cm     ENDOMETRIUM: Normal thickness endometrium 4.1 trace amount of fluid in the endocervical canal of doubtful significance measuring mm presumably small amount of blood/seroma.  T-shaped IUD within the endometrial canal satisfactory position.  MYOMETRIUM: Anterior uterine body leiomyoma measures 1.6 x 1.0 x 0.9 cm.     OVARIES AND ADNEXA:     RIGHT:   Measures 2.7 x 2.5 x 1.7 cm.  Small subcentimeter follicles  LEFT:   Measures 3.3 x 2.2 x 2.2 cm.  Small subcentimeter physiologic follicles.     CUL-DE-SAC:   Normal.  No free fluid or mass.    OTHER: Negative.  Bladder appears normal.                 Impression   CONCLUSION:  1. Anteverted uterus with normal thickness endometrium.    2. T-shaped IUD in satisfactory position within the endometrium.  3. Small anterior uterine body leiomyoma measures 1.6 x 1.0 x 0.9 cm.  4. Normal bilateral ovaries with simple subcentimeter follicles           Dictated by (CST): Richard Perea MD on 2/10/2025 at 10:36 AM      Finalized by (CST): Richard Perea MD on 2/10/2025 at 10:43 AM       Narrative   PROCEDURE: CT ABDOMEN + PELVIS (CONTRAST ONLY) (CPT=74177)     COMPARISON: [facility], CT ABDOMEN+PELVIS (CPT=74176), 9/17/2024, 1:57 AM.     INDICATIONS: Abdominal pain, nausea x3 days. Vomiting today.     TECHNIQUE: CT images of the abdomen and pelvis were obtained with non-ionic intravenous contrast material.  Automated exposure control for dose reduction was used. Adjustment of the mA and/or kV was done based on the patient's size. Use of iterative  reconstruction technique for dose reduction was used.  Dose information is transmitted to the ACR (American College of Radiology) NRDR (National Radiology Data  Registry) which includes the Dose Index Registry.     FINDINGS:  LOWER THORAX: Coronary artery calcifications are not seen.  No visible pulmonary or pleural disease.  LIVER:   No enlargement, atrophy, abnormal density, or significant focal lesion.  GALLBLADDER: Normal size and appearance.  BILIARY:   No visible dilatation or calcification.    PANCREAS:   No lesion, fluid collection, ductal dilatation, or atrophy.    SPLEEN:   No enlargement or focal lesion.    ADRENALS:   No mass or enlargement.    KIDNEYS:   No mass, obstruction, or calcification.    RETROPERITONEUM:   No mass or enlarged adenopathy.    AORTA/VASCULAR:   No aneurysm or dissection.  PERITONEUM: No free fluid or air.  GI TRACT/MESENTERY:   No visible mass, obstruction, or bowel wall thickening. The appendix is not seen but there is no pericecal inflammatory change.  URINARY BLADDER: Decompressed and poorly evaluated.  REPRODUCTIVE ORGANS: The uterus is present.  An intrauterine contraceptive device has good position.  The ovaries are unremarkable.  ABDOMINAL WALL:   No acute abnormality.  Small fat containing umbilical hernia.  BONES: No acute fracture.                     Impression   CONCLUSION:     No acute abnormality in the abdomen or pelvis.     No finding to explain abdominal pain or nausea/vomiting.  No bowel obstruction.  The appendix is not seen but there are no right lower quadrant inflammatory changes.  No renal or ureteral stones or hydronephrosis.  No gallstones or biliary ductal  dilatation.     Additional chronic or incidental findings are described in the body of this report.              el-remote        Dictated by (CST): Sunny Styles MD on 12/17/2024 at 11:44 AM      Finalized by (CST): Sunny Styles MD on 12/17/2024 at 11:49 AM          OTHER RELEVANT DATA:   none     IMPRESSION: Lower urinary tract symptoms namely urgency, frequency, dysuria.  Recommended behavioral management. Will send vikor urine. Offered cystoscopy and  pelvic. Can consider alternative management based on symptoms and above testing.     Behavioral management includes timed voiding (going to the bathroom on a schedule every 1-4 hours), double voiding (trying to pee twice), avoiding bladder irritants (coffee, tea, soda, pop, alcohol), compression stockings, elevation of feet, voiding prior to bedtime, avoiding fluids 2 to 4 hours before bedtime, IC/bladder diet, and constipation management. Sitz baths 2x/week, meditation 10 min/day, relaxation also recommended.      We talked about UTI prevention with continuing good hydration, starting a women's probiotic (bottle should say women's, vaginal, genitourinary; main ingredient should be lactobacillus), Cranberry pills (Ellura, Utiva, Crancap -all are found on [employer] on their respective website; they should have 36 mg PAC), bowel regimen (colace, senna, miralax), voiding before and after sexual activity and using pH balanced soaps. Can continue to check urine and treat when UCx is positive. If this persists,  can consider initiating low dose antibiotic prophylaxis for 6 months versus gentamicin irrigations if she would like a more local therapy.     For constipation management she can consider fruits (especially ones that start with \"P\"), vegetables, flaxseeds, hydration, fiber, MiraLAX, Colace or senna.  She can also use a squatty potty to help with efficiency of stooling.        Next steps would be taking off ineffective therapies and considering the following: mirabegron or antimuscarinics, Uribel daily (she was counseled that her urine may turn blue/green) vaginal/baclofen suppositories every other night prn, pelvic floor physical therapy, marshmallow root, aloe vera, turmeric, amitriptyline 25mg (titrated over several weeks to 100mg if tolerated/needed) qpm, gabapentin 100mg TID, aloe vera/marshmallow root, DMSO intravesical instillations, cystoscopy under anesthesia with hydrodistention, sacroneuromodulation.       PLAN:  Vikor urine  Behavioral management  Cystoscopy, pelvic exam    Thank you for referring this very pleasant patient to my clinic. If you have any questions or concerns, please do not hesitate to contact me.    Sincerely,  Regina Gonsalez MD    30 minutes were spent on this patient at this visit obtaining a history, reviewing medical records, developing a treatment plan, counseling and discussing treatment strategy with patient, coordination of care and documentation.     The 21st Century Cures Act makes medical notes available to patients in the interest of transparency.  However, please be advised that this is a medical document.  It is intended as a peer to peer communication.  It is written in medical language and may contain abbreviations or verbiage that are technical and unfamiliar.  It may appear blunt or direct.  Medical documents are intended to carry relevant information, facts as evident, and the clinical opinion of the practitioner.         [1] No Known Allergies

## 2025-03-10 NOTE — TELEPHONE ENCOUNTER
Vikor urine culture ordered.  Urine sample placed in bag, patient's name and date of birth filled out.  Face sheet, insurance card and office note printed.  Sample placed in Vikor box.

## 2025-03-12 ENCOUNTER — TELEPHONE (OUTPATIENT)
Dept: SURGERY | Facility: CLINIC | Age: 25
End: 2025-03-12

## 2025-03-12 RX ORDER — DOXYCYCLINE 100 MG/1
100 CAPSULE ORAL 2 TIMES DAILY
Qty: 14 CAPSULE | Refills: 0 | Status: SHIPPED | OUTPATIENT
Start: 2025-03-12 | End: 2025-03-19

## 2025-03-12 NOTE — TELEPHONE ENCOUNTER
Please let patient know that she she has a low-level bacterial infection.  I will write her for doxycycline for 7 days. She should not take this if breastfeeding.

## 2025-04-07 ENCOUNTER — TELEPHONE (OUTPATIENT)
Dept: SURGERY | Facility: CLINIC | Age: 25
End: 2025-04-07

## 2025-04-07 ENCOUNTER — PROCEDURE (OUTPATIENT)
Dept: SURGERY | Facility: CLINIC | Age: 25
End: 2025-04-07

## 2025-04-07 DIAGNOSIS — R39.9 LOWER URINARY TRACT SYMPTOMS: Primary | ICD-10-CM

## 2025-04-07 NOTE — TELEPHONE ENCOUNTER
Called patient, verified name and . I offered patient arrival time 1pm or 1:15pm, patient says she will double check and call us back.     Child's Well Visit, 9 to 11 Years: Care Instructions  Your Care Instructions  Your child is growing quickly and is more mature than in his or her younger years. Your child will want more freedom and responsibility. But your child still needs you to set limits and help guide his or her behavior. You also need to teach your child how to be safe when away from home. In this age group, most children enjoy being with friends. They are starting to become more independent and improve their decision-making skills. While they like you and still listen to you, they may start to show irritation with or lack of respect for adults in charge. Follow-up care is a key part of your child's treatment and safety. Be sure to make and go to all appointments, and call your doctor if your child is having problems. It's also a good idea to know your child's test results and keep a list of the medicines your child takes. How can you care for your child at home? Eating and a healthy weight  · Help your child have healthy eating habits. Most children do well with three meals and two or three snacks a day. Offer fruits and vegetables at meals and snacks. Give him or her nonfat and low-fat dairy foods and whole grains, such as rice, pasta, or whole wheat bread, at every meal.  · Let your child decide how much he or she wants to eat. Give your child foods he or she likes but also give new foods to try. If your child is not hungry at one meal, it is okay for him or her to wait until the next meal or snack to eat. · Check in with your child's school or day care to make sure that healthy meals and snacks are given. · Do not eat much fast food. Choose healthy snacks that are low in sugar, fat, and salt instead of candy, chips, and other junk foods. · Offer water when your child is thirsty. Do not give your child juice drinks more than one time a day. · Make meals a family time.  Have nice conversations at mealtime and turn the TV off.  · Do not use food as a reward or punishment for your child's behavior. Do not make your children \"clean their plates. \"  · Let all your children know that you love them whatever their size. Help your child feel good about himself or herself. Remind your child that people come in different shapes and sizes. Do not tease or nag your child about his or her weight, and do not say your child is skinny, fat, or chubby. · Do not let your child watch more than 1 or 2 hours of TV or video a day. Research shows that the more TV a child watches, the higher the chance that he or she will be overweight. Do not put a TV in your child's bedroom, and do not use TV and videos as a . Healthy habits  · Encourage your child to be active for at least one hour each day. Plan family activities, such as trips to the park, walks, bike rides, swimming, and gardening. · Do not smoke or allow others to smoke around your child. If you need help quitting, talk to your doctor about stop-smoking programs and medicines. These can increase your chances of quitting for good. Be a good model so your child will not want to try smoking. Parenting  · Set realistic family rules. Give your child more responsibility when he or she seems ready. Set clear limits and consequences for breaking the rules. · Have your child do chores that stretch his or her abilities. · Reward good behavior. Set rules and expectations, and reward your child when they are followed. For example, when the toys are picked up, your child can watch TV or play a game; when your child comes home from school on time, he or she can have a friend over. · Pay attention when your child wants to talk. Try to stop what you are doing and listen. Set some time aside every day or every week to spend time alone with each child so the child can share his or her thoughts and feelings. · Support your child when he or she does something wrong.  After giving your child time to think about a problem, help him or her to understand the situation. For example, if your child lies to you, explain why this is not good behavior. · Help your child learn how to make and keep friends. Teach your child how to introduce himself or herself, start conversations, and politely join in play. Safety  · Make sure your child wears a helmet that fits properly when he or she rides a bike or scooter. Add wrist guards, knee pads, and gloves for skateboarding, in-line skating, and scooter riding. · Walk and ride bikes with your child to make sure he or she knows how to obey traffic lights and signs. Also, make sure your child knows how to use hand signals while riding. · Show your child that seat belts are important by wearing yours every time you drive. Have everyone in the car buckle up. · Teach your child to stay away from unknown animals and not to esetr or grab pets. · Explain the danger of strangers. It is important to teach your child to be careful around strangers and how to react when he or she feels threatened. Talk about body changes  · Start talking about the changes your child will start to see in his or her body. This will make it less awkward each time. Be patient. Give yourselves time to get comfortable with each other. Start the conversations. Your child may be interested but too embarrassed to ask. · Create an open environment. Let your child know that you are always willing to talk. Listen carefully. This will reduce confusion and help you understand what is truly on your child's mind. · Communicate your values and beliefs. Your child can use your values to develop his or her own set of beliefs. School  Tell your child why you think school is important. Show interest in your child's school. Encourage your child to join a school team or activity. If your child is having trouble with classes, get a  for him or her.  If your child is having problems with friends, other students, or teachers, work with your child and the school staff to find out what is wrong. Immunizations  Flu immunization is recommended once a year for all children ages 7 months and older. At age 6 or 15, girls and boys should get the human papillomavirus (HPV) series of shots. A meningococcal shot is recommended at age 6 or 15. And a Tdap shot is recommended to protect against tetanus, diphtheria, and pertussis. When should you call for help? Watch closely for changes in your child's health, and be sure to contact your doctor if:  · You are concerned that your child is not growing or learning normally for his or her age. · You are worried about your child's behavior. · You need more information about how to care for your child, or you have questions or concerns. Where can you learn more? Go to http://robby-pam.info/. Enter Q591 in the search box to learn more about \"Child's Well Visit, 9 to 11 Years: Care Instructions. \"  Current as of: July 26, 2016  Content Version: 11.2  © 6260-5742 [employer]. Care instructions adapted under license by [employer] (which disclaims liability or warranty for this information). If you have questions about a medical condition or this instruction, always ask your healthcare professional. [address] any warranty or liability for your use of this information.

## 2025-04-07 NOTE — PROCEDURES
CYSTOSCOPY (FEMALE)    PRE-OP DIAGNOSIS: LUTS    POST-OP DIAGNOSIS: same    PROCEDURE: Cystsocopy    SURGEON: Regina Gonsalez MD    ASSISTANT: none     EBL: minimal    FINDINGS:   Urethra: No urethral lesions, no urethral strictures  Bladder: Bilateral ureteral orifices in orthotopic position with efflux, no suspicious or concerning erythematous lesions, no papillary bladder tumors, no stones   Retroflexion: no abnormalities   Other findings: none    INDICATIONS: LUTS    PROCEDURE: Patient was brought to the procedure suite and a timeout was performed identifying the patient,  and procedure being performed.  The risks of the procedure were once again detailed to the patient including bleeding, infection, dysuria.  The patient agreed to proceed.  The patient had a negative urinalysis.  No antibiotics were given to patient prior to this procedure.     She was placed in a supine position on the table and a flexible cystoscope was inserted per urethra.  There were no obvious urethral lesions or strictures. Once in the bladder we performed a full diagnostic/surveillance cystoscopy which demonstrated no abnormalities.  On retroflexion we noted no abnormalities.  The scope was then carefully removed and once again no urethral abnormalities were noted.    There were no complications after this procedure and the patient tolerated the procedure without issue.    IMPRESSION: cysto negative.    PLAN:    RTC prn

## 2025-04-07 NOTE — TELEPHONE ENCOUNTER
Per patient has a procedure today at 2:30pm and asking if it can be moved up to 2:-00 PM to make sure she can  her daughter from school on time. Please advise

## 2025-04-10 ENCOUNTER — OFFICE VISIT (OUTPATIENT)
Dept: OBGYN CLINIC | Facility: CLINIC | Age: 25
End: 2025-04-10

## 2025-04-10 VITALS
DIASTOLIC BLOOD PRESSURE: 78 MMHG | BODY MASS INDEX: 28 KG/M2 | SYSTOLIC BLOOD PRESSURE: 127 MMHG | WEIGHT: 170.81 LBS | HEART RATE: 76 BPM

## 2025-04-10 DIAGNOSIS — Z97.5 PRESENCE OF IUD: Primary | ICD-10-CM

## 2025-04-10 DIAGNOSIS — N64.4 BREAST PAIN, LEFT: ICD-10-CM

## 2025-04-10 DIAGNOSIS — Z30.09 GENERAL COUNSELING AND ADVICE FOR CONTRACEPTIVE MANAGEMENT: ICD-10-CM

## 2025-04-10 PROCEDURE — 99214 OFFICE O/P EST MOD 30 MIN: CPT | Performed by: NURSE PRACTITIONER

## 2025-04-10 RX ORDER — LEVONORGESTREL 52 MG/1
1 INTRAUTERINE DEVICE INTRAUTERINE ONCE
COMMUNITY

## 2025-04-10 NOTE — PROGRESS NOTES
[facility]   Obstetrics and Gynecology    Yarelis Gorves is a 24 year old female  Patient's last menstrual period was 2025.   Chief Complaint   Patient presents with    Consult     NEW PATIENT, SECOND OPINION ON IUD ISSUE   . New patient  History of Present Illness  patient has a history of ocular migraines, pre-eclampsia, and recurrent UTIs, presents with concerns about her Mirena IUD, which she has had for approximately five years. She reports experiencing irregular and inconsistent bleeding, which can last from a few days to several weeks. The bleeding is often brown and mucousy, which she finds concerning. She got mirena IUD for heavy periods and initially wasn't getting menses but in last 6-9 months having abnormal bleeding.    In addition to her concerns about the IUD, also reports discomfort and occasional pain in her left upper inner breast. She describes the pain as a discomfort that sometimes becomes a throbbing sensation lasting for a few hours. She has noticed this pain for about a year and was supposed to have an ultrasound done a year and a half ago but did not follow through.     also mentions that she has been managing her depression without medication.She has been seeing a therapist, although she has not had an appointment in about two months.  She is sexually active, has had a new partner in last 3 months. Had negative STI testing and cultures in February with a CNM and had an ultrasound also - below.    Pap:2025 NILM  Contraception: [id number] copper IUD    OBSTETRICS HISTORY:  OB History    Para Term  AB Living   2 2 1 1 0 1   SAB IAB Ectopic Multiple Live Births   0 0 0 0 1   Obstetric Comments   Pre eclampsia x 2       GYNE HISTORY:  Pap Date: 24 (4/10/2025  2:03 PM)  Pap Result Notes: NEG (4/10/2025  2:03 PM)      History   Sexual Activity    Sexual activity: Yes    Birth control/ protection: Mirena       MEDICAL HISTORY:  Past Medical  History[1]    SOCIAL HISTORY:  Social History     Socioeconomic History    Marital status: Single     Spouse name: Not on file    Number of children: Not on file    Years of education: Not on file    Highest education level: Not on file   Occupational History    Not on file   Tobacco Use    Smoking status: Former     Current packs/day: 0.00     Types: Cigarettes     Quit date: 2019     Years since quittin.3    Smokeless tobacco: Never   Vaping Use    Vaping status: Some Days   Substance and Sexual Activity    Alcohol use: Yes     Comment: OCC    Drug use: Yes     Types: Cannabis    Sexual activity: Yes     Birth control/protection: Mirena   Other Topics Concern     Service Not Asked    Blood Transfusions Not Asked    Caffeine Concern No    Occupational Exposure Not Asked    Hobby Hazards Not Asked    Sleep Concern Not Asked    Stress Concern Not Asked    Weight Concern Not Asked    Special Diet Not Asked    Back Care Not Asked    Exercise Not Asked    Bike Helmet Not Asked    Seat Belt Not Asked    Self-Exams Not Asked   Social History Narrative    Not on file     Social Drivers of Health     Food Insecurity: Not on file   Transportation Needs: Not on file   Stress: Not on file   Housing Stability: Low Risk  (2021)    Received from [facility]    Housing Stability     Mortgage Payment Concerns?: Not on file     Number of Places Lived in the Last Year: Not on file     Unstable Housing?: Not on file       MEDICATIONS:  Medications - Current[2]    ALLERGIES:  Allergies[3]      Review of Systems:  Constitutional:  Denies fatigue, night sweats, hot flashes  Cardiovascular:  denies chest pain or palpitations  Respiratory:  denies shortness of breath  Gastrointestinal:  denies heartburn, abdominal pain, diarrhea or constipation  Genitourinary:  denies dysuria, incontinence, abnormal vaginal discharge, vaginal itching see HPI  Musculoskeletal:  denies back pain.  Skin/Breast:  +left  breast pain  Neurological:  denies headaches, extremity weakness or numbness.  Psychiatric: denies depression or anxiety.  Endocrine:   denies excessive thirst or urination.  Heme/Lymph:  denies history of anemia, easy bruising or bleeding.      PHYSICAL EXAM:     Vitals:    04/10/25 1403   BP: 127/78   Pulse: 76   Weight: 170 lb 12.8 oz (77.5 kg)     Body mass index is 27.57 kg/m².     Patient offered chaperone, patient declined    Constitutional: well developed, well nourished  Head/Face: normocephalic  Lymphatic:no abnormal supraclavicular or axillary adenopathy is noted  Breast: bilateral density; patient describes pain in LUIQ - nontender to palpation; right breast normal without palpable masses, tenderness, asymmetry, nipple discharge, nipple retraction or skin changes    Psychiatric:  Oriented to time, place, person and situation. Appropriate mood and affect      Results      Study Result    Narrative   PROCEDURE: US PELVIS TRANSABDOMINAL AND TRANSVAGINAL (CPT=76856/33248)     COMPARISON: None.     INDICATIONS: IUD threads lost     TECHNIQUE: Pelvic ultrasound using transabdominal and transvaginal technique.  A transvaginal scan was performed for endometrial and adnexal evaluation     FINDINGS:  UTERUS:   Measures 9.9 x 3.7 x 7.1 cm     ENDOMETRIUM: Normal thickness endometrium 4.1 trace amount of fluid in the endocervical canal of doubtful significance measuring mm presumably small amount of blood/seroma.  T-shaped IUD within the endometrial canal satisfactory position.  MYOMETRIUM: Anterior uterine body leiomyoma measures 1.6 x 1.0 x 0.9 cm.     OVARIES AND ADNEXA:     RIGHT:   Measures 2.7 x 2.5 x 1.7 cm.  Small subcentimeter follicles  LEFT:   Measures 3.3 x 2.2 x 2.2 cm.  Small subcentimeter physiologic follicles.     CUL-DE-SAC:   Normal.  No free fluid or mass.    OTHER: Negative.  Bladder appears normal.                 Impression   CONCLUSION:  1. Anteverted uterus with normal thickness endometrium.     2. T-shaped IUD in satisfactory position within the endometrium.  3. Small anterior uterine body leiomyoma measures 1.6 x 1.0 x 0.9 cm.  4. Normal bilateral ovaries with simple subcentimeter follicles           Dictated by (CST): Richard Perea MD on 2/10/2025 at 10:36 AM      Finalized by (CST): Richard Perea MD on 2/10/2025 at 10:43 AM       Assessment & Plan:    ICD-10-CM    1. Presence of IUD  Z97.5       2. General counseling and advice for contraceptive management  Z30.09       3. Breast pain, left  N64.4 US BREAST LEFT LIMITED (CPT=76642)        Assessment & Plan  Abnormal Uterine Bleeding  Irregular bleeding likely due to Mirena IUD. Reviewed effective for  years for contraception but heavy periods for 5 years.  - Provided information on alternative contraceptive methods: progestin-only pill, Depo-Provera, Nexplanon.  - desires IUD removal but she is unsure which method she prefers to have next.  Scheduled IUD removal and discussed replacement options or alternative contraception.  - Urine pregnancy test on day of IUD removal if new device is placed.    Ocular Migraines  Progestin-only contraceptives generally safe; would avoid estrogen.  - Discussed safety of progestin-only contraceptives for migraines with aura.  - Provided information on progestin-only contraceptive options.    Breast Pain  Breast pain with dense tissue, no redness or lumps, not cycle-related.  - Ordered breast ultrasound to evaluate cause of pain.  - Provided information on scheduling ultrasound and potential follow-up imaging.      General Health Maintenance  Attempting to quit vaping, reduced marijuana use, aware of migraine triggers.  - Encouraged continued efforts to quit vaping and reduce marijuana use.    YANELY De Oliveira        This note was prepared using Dragon Medical voice recognition dictation software. As a result errors may occur. When identified these errors have been corrected. While every attempt is  made to correct errors during dictation discrepancies may still exist.         [1]   Past Medical History:   Migraines    hx of ocular migraines    Preeclampsia (HCC)   [2]   Current Outpatient Medications:     Levonorgestrel (MIRENA, 52 MG,) 20 MCG/DAY Intrauterine IUD, 20 mcg (1 each total) by Intrauterine route one time., Disp: , Rfl:   [3] No Known Allergies

## 2025-04-10 NOTE — PROGRESS NOTES
The following individual(s) verbally consented to be recorded using ambient AI listening technology and understand that they can each withdraw their consent to this listening technology at any point by asking the clinician to turn off or pause the recording:    Patient name: Yarelis Jacobsel

## 2025-04-10 NOTE — PATIENT INSTRUCTIONS
Fact Sheet: Progestin IUD - Kyleena, Mirena®, Liletta®, Elise®    How Does The Progestin IUD Work?   The progestin IUD is a T-shaped plastic abilio that stays in your uterus. It contains a hormone (progestin) like the ones your body makes. The hormone blocks sperm from reaching the egg and stops the release of eggs. If sperm cannot reach an egg, you cannot get pregnant.     No method of birth control is 100% effective. The progestin IUD is over 99% effective.     After The Progestin IUD Is Inserted, When Can I Have Sex?   You must wait 24 hours after your IUD is placed before you use tampons, take a bath, or have sex.      When Does The Progestin IUD Start Working?   The progestin IUD starts to work 7 days after it is inserted. For 7 days after your IUD is inserted, use condoms or continue your pills/patch/ring as back-up.       How Long Does The Progestin IUD Last?   Mirena® and Liletta work for 7 years. Elise® works for 3 years. Kyleena works for 5 years    Is There Anything I Need To Do After Having The IUD Inserted?   Some women like to check their IUD's string after each period. To check, insert a finger into your vagina and feel for the cervix (It feels like the tip of your nose.) You should feel the string near your cervix. Do not pull on the string.     What Do I Do If And When I Decide To Get Pregnant?   When you are ready, your healthcare provider will remove your IUD. Most women get pregnant soon after removal.    How Does The Progestin IUD Help Me?   You do not need to think about birth control before or during sex.   You do not need refills (as you do for the pill).   You can use the progestin IUD while breastfeeding.   You may have less cramping and bleeding with periods.   The progestin IUD costs less than most types of birth control.    How Will I Feel Having The Progestin IUD In Me?  How Will My Body Change?   You will not feel the IUD in you.     You may have cramps and spotty periods for the first  few months. Ibuprofen can help. You can take up to 4 pills (800 mg) of Ibuprofen every 8 hours with food. To prevent cramps, take Ibuprofen when your period starts and keep taking it every 8 hours for the first 2-3 days of your period. You can also put a hot water bottle on your belly if you have bad cramps.     You may stop having periods after 1-2 years with the progestin IUD. This is normal.    You may have spotting, bloating, nausea, headaches, or breast tenderness.      Does The Progestin IUD Have Risks?   The progestin IUD is very safe. Serious problems are rare. If you have the following symptoms within the first 3 weeks after getting an IUD, see your healthcare provider:   Fever (>101ºF)   Chills   Strong or sharp pain in your stomach or belly   If you have the following symptoms at any time while you have an IUD in you, see your healthcare provider:   Feeling pregnant (breast tenderness, nausea, vomiting)   Positive home pregnancy test    ** Remember, the progestin IUD does not protect you from Sexually Transmitted Infections or HIV. Always use condoms to protect yourself! **    Reproductive Health Access Project  www.reproductiveaccess.org        Progestin Implant (Nexplanon)    HOW DOES THE IMPLANT WORK?   The progestin implant is a thin plastic tube about the size of a paper matchstick. A  clinician inserts it under the skin of your upper arm.   The implant releases progestin, a hormone like the ones your body makes. It  works by making the mucus in your cervix too thick for sperm to pass through.  If sperm cannot reach the egg, you cannot get pregnant.   Each implant lasts up to 5 years.   No method of birth control is 100% effective. The implant is over 99% effective.    HOW DO I USE THE IMPLANT?   After numbing your skin, a clinician inserts the implant under the  skin of your upper arm. This takes a few minutes. It is done in the office or clinic.   You should keep the wound clean and dry for at  least 24 hours after you had the  implant inserted.   You should use condoms as back-up during the first 7 days after you get  the implant.    HOW DOES THE IMPLANT HELP ME?   The implant is safe and effective birth control. Once you have it, it works on its  own - you don’t have to do anything.   You can use the implant while breastfeeding.   The implant is a great option for people who prefer to avoid estrogen-containing  methods.   You can use one implant for 5 years. If you want to use it longer, you can get a  new implant after 5 years. If you don’t like it or you decide to get pregnant, your  clinician can remove the implant before 5 years have passed.    HOW WILL I FEEL USING THE IMPLANT?   The implant causes periods to change. Most people have off-and-on spotting.  Spotting may last until you have the implant removed. This is normal.   A few people have: mood changes, weight gain, headache, acne, and/or skin  changes in the upper arm.   Most side effects go away when you have the implant removed.    CAN PEOPLE SEE THE IMPLANT IN MY ARM?   Most implants cannot be seen, but you can feel it if you touch the skin over  the implant.    DOES THE IMPLANT HAVE RISKS?   The implant is very safe.   If you have any of the following symptoms within the first week after insertion,  see your clinician:  - Redness, warmth, or drainage from your arm  - Fever (>101ºF)   If you have any of the following symptoms at any time while you have the  implant, see your clinician:  - Feeling pregnant (breast pain, nausea)      Reproductive Health Access Project  Www.reproductiveaccess.org     FACT SHEET: PROGESTIN-ONLY/ MINI-PILL               HOW DOES THE MINI-PILL WORK?   The mini-pill contains a hormone like the ones your body makes.  It works by making the mucus in your cervix too thick for sperm to pass through.  If sperm cannot reach the egg, you cannot get pregnant.   No method of birth control is 100% effective, but birth  control pills are 92-98% effective if you take them each day.    HOW DO I START THE MINI-PILL?   There are 2 ways to start the pill:    Quick Start: Take your first pill as soon as you get the pack.    Next period: Take your first pill soon after your next period begins.    If you take your first pill up to 5 days after the start of your period, you are protected against pregnancy right away.    If you take your first pill more than 5 days after the start of your period, you should use condoms as back-up for the first 7 days.    HOW DO I USE THE MINI-PILL?   Once you start using the pill, take 1 pill each day. Take your pill at the same time each day.   After you finish a pack of pills, you should start a new pack the next day. You should have NO day without a pill.    WHAT IF I MISS MINI-PILLS?   I forgot ONE pill:  Take your pill as soon as you can. If you take your pill more than 3 hours late, use condoms for the next 7 days.   I forgot TWO pills or more: Take your pill as soon as you can. Take your next pill at the usual time. Use condoms for the next 7 days. Use emergency contraception (EC) if you have unprotected sex.      WHAT IF I STOPPED TAKING THE MINI-PILL AND HAD UNPROTECTED SEX?    Take Emergency Contraception (EC) right away.  EC can prevent pregnancy up to 5 days after sex, and it works better the sooner you take it.    HOW DOES THE MINI-PILL HELP ME?   The mini-pill is safe & effective birth control.  The mini-pill is safe for you to use while breastfeeding.     The mini-pill has no effect on your ability to get pregnant in the future, after you stop taking it.      HOW WILL I FEEL ON THE MINI-PILL?   You will feel about the same.  Most women notice changes in their periods.  You may have spotting or no period at all.  This is normal.  You may have nausea, spotting, weight change, and/or breast pain.  These problems often go away after 2-3 months.    DOES THE MINI-PILL HAVE RISKS?   The mini-pill is  very safe.     **Remember, the mini-pill does not protect you from Sexually Transmitted Infections or HIV. Always use condoms to protect yourself!**    Reproductive health access project  Www.reproductiveaccess.org

## 2025-04-17 ENCOUNTER — OFFICE VISIT (OUTPATIENT)
Dept: PHYSICAL MEDICINE AND REHAB | Facility: CLINIC | Age: 25
End: 2025-04-17
Payer: MEDICAID

## 2025-04-17 VITALS — WEIGHT: 170 LBS | BODY MASS INDEX: 27 KG/M2

## 2025-04-17 DIAGNOSIS — Z71.6 ENCOUNTER FOR TOBACCO USE CESSATION COUNSELING: ICD-10-CM

## 2025-04-17 DIAGNOSIS — M54.50 CHRONIC BILATERAL LOW BACK PAIN WITHOUT SCIATICA: ICD-10-CM

## 2025-04-17 DIAGNOSIS — G89.29 CHRONIC BILATERAL LOW BACK PAIN WITHOUT SCIATICA: ICD-10-CM

## 2025-04-17 DIAGNOSIS — M54.10 RADICULAR PAIN OF LEFT LOWER EXTREMITY: ICD-10-CM

## 2025-04-17 DIAGNOSIS — M54.2 NECK PAIN: Primary | ICD-10-CM

## 2025-04-17 PROCEDURE — 99204 OFFICE O/P NEW MOD 45 MIN: CPT | Performed by: PHYSICAL MEDICINE & REHABILITATION

## 2025-04-17 NOTE — PROGRESS NOTES
NEW PATIENT VISIT    CHIEF COMPLAINT  Low back pain    HISTORY OF PRESENTING ILLNESS    Yarelis Groves is a 24 year old female who presents for evaluation of low back pain.     History of Present Illness  Leandra, a patient with a history of chronic pain, presents with complaints of neck pain, lower back pain, and sciatica. The neck pain, which she has experienced since childhood, is characterized by a popping sensation and heat if she turns too quickly. The pain has recently begun to radiate to her head. Her lower back pain is more noticeable with physical activity, but can occur without any physical exertion. The pain is primarily located from her midsection to the upper buttock area. Leandra also experiences sciatica, which she describes as pain in the back area that radiates down both legs, with the left leg being more prominent. This pain has been consistent since the birth of her daughter seven years ago. Leandra has tried heat therapy and yoga to alleviate her pain, which provides temporary relief but does not eliminate the pain. She has not taken any medication for her pain as an adult, as she is concerned about becoming reliant on pain relievers. Leandra also mentions that she has mild arthritis in her neck and ab separation. She has a physically demanding job, which has become more difficult due to her pain.  PAST MEDICAL HISTORY  Past Medical History[1]    PAST SURGICAL HISTORY  Past Surgical History[2]    MEDICATIONS  Medications Ordered Prior to Encounter[3]    ALLERGIES  Allergies[4]    SOCIAL HISTORY   reports that she quit smoking about 5 years ago. Her smoking use included cigarettes. She has never used smokeless tobacco. She reports current alcohol use. She reports current drug use. Drug: Cannabis.    FAMILY HISTORY  Family History[5]    REVIEW OF SYSTEMS  Complete review of systems was performed and was negative except for those items stated in the History of Presenting Illness and Past  Medical/Surgical History.    PHYSICAL EXAMINATION  GENERAL:  In no acute distress. Well-developed and well nourished.   SKIN: No rashes or open wounds involving posterior torso, posterior pelvis, lower extremities.  NEUROLOGIC:   Strength: 5/5 bilaterally with hip flexion, knee extension, knee flexion, ankle dorsiflexion, ankle eversion, ankle inversion, ankle plantarflexion, and great toe extension.   Sensation: intact light touch sensation throughout both lower extremities.   Reflexes: intact and symmetric in bilateral lower extremities. Babinski downgoing bilaterally. No clonus.   Gait: able to heel walk, toe walk, and perform tandem gait.   MUSCULOSKELETAL:  Physical Exam  MUSCULOSKELETAL: Pain in mid to upper buttock region on extension and rotation of spine. Stretch sensation in mid to upper buttock region on forward flexion of spine. Mild pain in neck on extension. Pain on left side of neck on right rotation. No pain on neck flexion or left rotation of neck.  NEUROLOGICAL: Cranial nerves grossly intact. Sensation intact in examined areas. Motor strength intact in upper extremities.       REVIEW OF PRIOR X-RAYS/STUDIES  X-rays of the cervical spine independently reviewed dated 12/17/2024 which reveal very minimal/mild degenerative changes, normal alignment.    IMPRESSION/DIAGNOSIS  Encounter Diagnoses   Name Primary?    Neck pain Yes    Chronic bilateral low back pain without sciatica     Radicular pain of left lower extremity     Encounter for tobacco use cessation counseling        TREATMENT/PLAN  Assessment & Plan  Chronic Neck Pain with Mild Arthritis  Chronic neck pain with mild arthritis, exacerbated by movement. Suspected joint and muscle issues. Advised increasing muscle mass for stability and joint stress reduction.  - Order X-ray of the neck with flexion and extension views.  - Refer to physical therapy for neck stabilization exercises.  - Advise on resistance exercises to increase neck muscle  mass.    Chronic Low Back Pain  Chronic low back pain, exacerbated by activity, with pain radiating to upper buttocks. Suspected joint and muscle issues. Advised increasing muscle mass for stability and joint stress reduction.  - Order X-ray of the low back with flexion and extension views.  - Refer to physical therapy for low back stabilization exercises.  - Advise on resistance exercises to increase low back muscle mass.    Sciatica  Sciatica symptoms for seven years, affecting left leg. Suspected nerve root irritation in low back.  - Order nerve study of the left leg.  - Refer to physical therapy for sciatica management.    Diet and Lifestyle Considerations  Discussed diet's impact on inflammation. Advised against smoking cigarettes due to inflammatory effects.  - Advise on dietary changes incorporating whole foods, plant-based, Mediterranean, and anti-inflammatory principles.    Follow-up  Plans include obtaining diagnostic studies and initiating physical therapy. She is motivated to address underlying causes of pain.  - Schedule X-ray and nerve study appointments.  - Initiate physical therapy sessions.  - Follow up on diagnostic results and therapy progress.      Education was provided regarding the above impression/diagnosis and treatment options/plan were discussed.  All questions were answered during today's visit.  Patient will contact clinic if any other questions or concerns.            Molina Foley DO  Interventional Spine and Sports Medicine Specialist   Physical Medicine and Rehabilitation  [address] [address]    [facility]  [address] [place] [address]               [1]   Past Medical History:   Migraines    hx of ocular migraines    Preeclampsia (HCC)   [2]   Past Surgical History:  Procedure Laterality Date          x 2   [3]   Current Outpatient Medications on File Prior to Visit   Medication Sig  Dispense Refill    Levonorgestrel (MIRENA, 52 MG,) 20 MCG/DAY Intrauterine IUD 20 mcg (1 each total) by Intrauterine route one time.       No current facility-administered medications on file prior to visit.   [4] No Known Allergies  [5]   Family History  Problem Relation Age of Onset    Diabetes Other         Family h/o Diabetes    Blindness Other         Family h/o Blindness

## 2025-04-18 ENCOUNTER — TELEPHONE (OUTPATIENT)
Dept: PHYSICAL THERAPY | Age: 25
End: 2025-04-18

## 2025-04-21 ENCOUNTER — HOSPITAL ENCOUNTER (OUTPATIENT)
Dept: GENERAL RADIOLOGY | Age: 25
Discharge: HOME | End: 2025-04-21
Attending: PHYSICAL MEDICINE & REHABILITATION
Payer: MEDICAID

## 2025-04-21 DIAGNOSIS — M54.50 CHRONIC BILATERAL LOW BACK PAIN WITHOUT SCIATICA: ICD-10-CM

## 2025-04-21 DIAGNOSIS — M54.2 NECK PAIN: ICD-10-CM

## 2025-04-21 DIAGNOSIS — G89.29 CHRONIC BILATERAL LOW BACK PAIN WITHOUT SCIATICA: ICD-10-CM

## 2025-04-21 PROCEDURE — 72110 X-RAY EXAM L-2 SPINE 4/>VWS: CPT | Performed by: PHYSICAL MEDICINE & REHABILITATION

## 2025-04-21 PROCEDURE — 72052 X-RAY EXAM NECK SPINE 6/>VWS: CPT | Performed by: PHYSICAL MEDICINE & REHABILITATION

## 2025-05-01 ENCOUNTER — OFFICE VISIT (OUTPATIENT)
Dept: PHYSICAL THERAPY | Age: 25
End: 2025-05-01
Attending: PHYSICAL MEDICINE & REHABILITATION
Payer: MEDICAID

## 2025-05-01 DIAGNOSIS — M54.2 NECK PAIN: Primary | ICD-10-CM

## 2025-05-01 DIAGNOSIS — M54.10 RADICULAR PAIN OF LEFT LOWER EXTREMITY: ICD-10-CM

## 2025-05-01 DIAGNOSIS — G89.29 CHRONIC BILATERAL LOW BACK PAIN WITHOUT SCIATICA: ICD-10-CM

## 2025-05-01 DIAGNOSIS — M54.50 CHRONIC BILATERAL LOW BACK PAIN WITHOUT SCIATICA: ICD-10-CM

## 2025-05-01 PROCEDURE — 97162 PT EVAL MOD COMPLEX 30 MIN: CPT | Performed by: PHYSICAL THERAPIST

## 2025-05-01 PROCEDURE — 97110 THERAPEUTIC EXERCISES: CPT | Performed by: PHYSICAL THERAPIST

## 2025-05-02 NOTE — PROGRESS NOTES
SPINE EVALUATION:     Diagnosis:   Neck pain (M54.2)  Chronic bilateral low back pain without sciatica (M54.50,G89.29)  Radicular pain of left lower extremity (M54.10) Patient:  Yarelis Groves (24 year old, female)        Referring Provider: Molina Foley  Today's Date   5/2/2025    Precautions:  None   Date of Evaluation: 05/01/25  Next MD visit: NA  Date of Surgery: NA     PATIENT SUMMARY   Summary of chief complaints: Intermttent (B) lower neck and base of skull pain/ache/tension/headache/tingling rated 0-3-10/10.  History of current condition: Yarelis report that she started feeling neck and base of skull symptoms about 1.5 years ago for no apparent reason.  She feels that her symptoms are unchanged or worse at times due to more frequent episodes.   Pain level: current 0.3 /10, at best 0 /10, at worst 10 /10  Description of symptoms: Intermttent (B) lower neck and base of skull pain/ache/tension/headache/tingling rated 0-3-10/10.   Occupation: Own cleaning company; drives a car.   Leisure activities/Hobbies: Ride a bike, walk, run, watch TV/read sitting on bed with 2-3 pillows behind her or sofa; homechores.   Prior level of function: Chronic neck pain but still able to do all work, home, and recreational activities.  Current limitations: Inability to bend, sit on a soft surface, turn her head (L>R), lay down on her (L) side, do chores, clean, and drive without producing or increasing symptoms in the neck and base of skull.  Pt goals: No neck pain and headache to return to all work, home, and recreational activities.  Red flag signs/symptoms: Pt denies pain that wakes in sleep, fever, recent trauma, history of CA, pain unchanged with movement/activity; Pt positive for: Dizziness, nausea  Past medical history was reviewed with Yarelis.  Significant findings include: LBP; Occular headache  Imaging/Tests: x-ray   Yarelis  has a past medical history of Migraines and Preeclampsia (HCC).  She  has a past  surgical history that includes .    ASSESSMENT  Yarelis presents to physical therapy evaluation with primary c/o Intermttent (B) lower neck and base of skull pain/ache/tension/headache/tingling rated 0-3-10/10.. The results of the objective tests and measures show Yarelis is presenting with a cervical spine central derangement with a directional preference toward lower cervical extension.  Her symptoms in the neck abolished and CROM increased toward extension and retraction after this directional preference exercise.. Functional deficits include but are not limited to Inability to bend, sit on a soft surface, turn her head (L>R), lay down on her (L) side, do chores, clean, and drive without producing or increasing symptoms in the neck and base of skull.. Signs and symptoms are consistent with diagnosis of Neck pain (M54.2)  Chronic bilateral low back pain without sciatica (M54.50,G89.29)  Radicular pain of left lower extremity (M54.10). Pt and PT discussed evaluation findings, pathology, POC and HEP.  Pt voiced understanding and performs HEP correctly without reported pain. Skilled Physical Therapy is medically necessary to address the above impairments and reach functional goals.    OBJECTIVE:    Musculoskeletal:  Observation/Posture: -- (Slouched, kyphotic, protruded head, rounded shoulders, reduced lumbar lordosis)   Accessory Motion: Posture correction in sitting with cueing to promote lumbar lordosis; centralized symptoms to mid lower neck and abolished.   Palpation:       Special tests:         Movement Loss   Pre-test symptom: 0/10 neck and headache           Cervical Flexion: Nil loss; P, NW pull/ache (Cervical Retraction: Minimal loss; P, NW mid neck ache/tight)   Cervical Extension: Moderate loss; P, NW mid neck ache/tightness (Cervical Retraction: Minimal loss; NE (feels warm))   Cervical (R) SB: Nil loss; NE Cervical (L) SB: NE   Cervical (R) Rotation: Nil loss; NE Cervical (L) Rotation: P, MW  ache (L) neck     Neurological:  Sensation: grossly intact to light touch RACHNA UE/LE       Today's Treatment and Response:   Pt education was provided on exam findings, treatment diagnosis, treatment plan, expectations, and prognosis.  Today's Treatment       5/1/2025   Spine Treatment   Therapeutic Exercise Sitting posture correction with lumbar roll    Seated: c/s retraction 2 x 10 reps; P, NW (better) mid neck ache/stretch    - increased CROM toward extension and retraction    Supinelying no pillow x 2 mins; NE unload neck    + c/s retraction x 10 reps; NE    Sitting posture correction with lumbar roll     Therapeutic Exercise Minutes 20   Evaluation Minutes 25   Total Time Of Timed Procedures 20   Total Time Of Service-Based Procedures 25   Total Treatment Time 45   HEP Sitting posture correction with lumbar roll    Seated: c/s retraction x 10 reps x 4-5x/day    Supinelying no pillow x 2 mins before sleeping at night    Sitting posture correction with lumbar roll (recommended)    Avoidance of sitting on sofa/couch/bed            Patient was instructed in and issued a HEP for: Sitting posture correction with lumbar roll    Seated: c/s retraction x 10 reps x 4-5x/day    Supinelying no pillow x 2 mins before sleeping at night    Sitting posture correction with lumbar roll (recommended)    Avoidance of sitting on sofa/couch/bed        Charges:  PT EVAL: Moderate Complexity, TherEx x 1  In agreement with evaluation findings and clinical rationale, this evaluation involved MODERATE COMPLEXITY decision making due to 1-2 personal factors/comorbidities, 3 or more body structures involved/activity limitations, and evolving symptoms as documented in the evaluation.                                                                         PLAN OF CARE:    Goals: (to be met in 10 visits)   1. Patient to consistently perform their HEP and it's progression to maintain their improved condition.  2.  Patient to have reduced,  centralized, and abolished symptoms in the neck to enable easier home, work, functional, and recreational activities.   3. Patient to have WNL of CROM in all directions to be able to bend, sit on a soft surface, turn her head (L>R), lay down on her (L) side, do chores, clean, and drive without producing or increasing symptoms in the neck and base of skull.   4. Patient to consistently have good posture to promote their symptom free condition.     Frequency / Duration: Patient will be seen 2-1x/week or a total of 10  visits over a 90 day period. Treatment will include: Manual Therapy; Therapeutic Activities; Therapeutic Exercise; Home Exercise Program instruction (Directional preference exercise, posture correction/strengthening, patient education, and HEP progression.)    Education or treatment limitation: None   Rehab Potential: good     Neck Disability Index Score  Score: 46 % (5/1/2025 12:54 PM)      Patient/Family/Caregiver was advised of these findings, precautions, and treatment options and has agreed to actively participate in planning and for this course of care.    Thank you for your referral. Please co-sign or sign and return this letter via fax as soon as possible to [phone]. If you have any questions, please contact me at Dept: [phone]    Sincerely,  Electronically signed by therapist: Hardy Mehta, PT, Cert MDT  Physician's certification required: Yes  I certify the need for these services furnished under this plan of treatment and while under my care.    X___________________________________________________ Date____________________    Certification From: 5/2/2025  To:7/31/2025

## 2025-05-06 ENCOUNTER — TELEPHONE (OUTPATIENT)
Dept: PHYSICAL THERAPY | Facility: HOSPITAL | Age: 25
End: 2025-05-06

## 2025-05-09 ENCOUNTER — TELEPHONE (OUTPATIENT)
Dept: PHYSICAL THERAPY | Facility: HOSPITAL | Age: 25
End: 2025-05-09

## 2025-05-12 ENCOUNTER — TELEPHONE (OUTPATIENT)
Dept: PHYSICAL THERAPY | Facility: HOSPITAL | Age: 25
End: 2025-05-12

## 2025-05-13 ENCOUNTER — APPOINTMENT (OUTPATIENT)
Dept: PHYSICAL THERAPY | Age: 25
End: 2025-05-13
Attending: PHYSICAL MEDICINE & REHABILITATION
Payer: MEDICAID

## 2025-05-14 ENCOUNTER — HOSPITAL ENCOUNTER (OUTPATIENT)
Age: 25
Discharge: HOME | End: 2025-05-14
Attending: STUDENT IN AN ORGANIZED HEALTH CARE EDUCATION/TRAINING PROGRAM
Payer: MEDICAID

## 2025-05-14 VITALS
DIASTOLIC BLOOD PRESSURE: 90 MMHG | SYSTOLIC BLOOD PRESSURE: 130 MMHG | TEMPERATURE: 98 F | RESPIRATION RATE: 18 BRPM | OXYGEN SATURATION: 100 % | HEART RATE: 80 BPM

## 2025-05-14 DIAGNOSIS — D72.819 LEUKOPENIA, UNSPECIFIED TYPE: ICD-10-CM

## 2025-05-14 DIAGNOSIS — R19.7 DIARRHEA, UNSPECIFIED TYPE: ICD-10-CM

## 2025-05-14 DIAGNOSIS — R11.10 VOMITING, UNSPECIFIED VOMITING TYPE, UNSPECIFIED WHETHER NAUSEA PRESENT: Primary | ICD-10-CM

## 2025-05-14 LAB
#MXD IC: 0.4 X10ˆ3/UL (ref 0.1–1)
B-HCG UR QL: NEGATIVE
BUN BLD-MCNC: 12 MG/DL (ref 7–18)
CHLORIDE BLD-SCNC: 100 MMOL/L (ref 98–112)
CLARITY UR: CLEAR
CO2 BLD-SCNC: 22 MMOL/L (ref 21–32)
CREAT BLD-MCNC: 0.9 MG/DL (ref 0.55–1.02)
EGFRCR SERPLBLD CKD-EPI 2021: 91 ML/MIN/1.73M2 (ref 60–?)
GLUCOSE BLD-MCNC: 79 MG/DL (ref 70–99)
GLUCOSE UR STRIP-MCNC: NEGATIVE MG/DL
HCT VFR BLD AUTO: 41.7 % (ref 35–48)
HCT VFR BLD CALC: 45 % (ref 34–50)
HGB BLD-MCNC: 13.7 G/DL (ref 12–16)
ISTAT IONIZED CALCIUM FOR CHEM 8: 1.19 MMOL/L (ref 1.12–1.32)
KETONES UR STRIP-MCNC: 40 MG/DL
LEUKOCYTE ESTERASE UR QL STRIP: NEGATIVE
LYMPHOCYTES # BLD AUTO: 0.8 X10ˆ3/UL (ref 1–4)
LYMPHOCYTES NFR BLD AUTO: 22.2 %
MCH RBC QN AUTO: 29.1 PG (ref 26–34)
MCHC RBC AUTO-ENTMCNC: 32.9 G/DL (ref 31–37)
MCV RBC AUTO: 88.5 FL (ref 80–100)
MIXED CELL %: 10.1 %
NEUTROPHILS # BLD AUTO: 2.6 X10ˆ3/UL (ref 1.5–7.7)
NEUTROPHILS NFR BLD AUTO: 67.7 %
NITRITE UR QL STRIP: NEGATIVE
PH UR STRIP: 5.5 [PH]
PLATELET # BLD AUTO: 199 X10ˆ3/UL (ref 150–450)
POTASSIUM BLD-SCNC: 3.8 MMOL/L (ref 3.6–5.1)
PROT UR STRIP-MCNC: 30 MG/DL
RBC # BLD AUTO: 4.71 X10ˆ6/UL (ref 3.8–5.3)
SODIUM BLD-SCNC: 138 MMOL/L (ref 136–145)
SP GR UR STRIP: >=1.03
UROBILINOGEN UR STRIP-ACNC: <2 MG/DL
WBC # BLD AUTO: 3.8 X10ˆ3/UL (ref 4–11)

## 2025-05-14 PROCEDURE — 99214 OFFICE O/P EST MOD 30 MIN: CPT

## 2025-05-14 PROCEDURE — 96361 HYDRATE IV INFUSION ADD-ON: CPT

## 2025-05-14 PROCEDURE — 81025 URINE PREGNANCY TEST: CPT

## 2025-05-14 PROCEDURE — 81002 URINALYSIS NONAUTO W/O SCOPE: CPT

## 2025-05-14 PROCEDURE — 96374 THER/PROPH/DIAG INJ IV PUSH: CPT

## 2025-05-14 PROCEDURE — 80047 BASIC METABLC PNL IONIZED CA: CPT

## 2025-05-14 PROCEDURE — 85025 COMPLETE CBC W/AUTO DIFF WBC: CPT | Performed by: STUDENT IN AN ORGANIZED HEALTH CARE EDUCATION/TRAINING PROGRAM

## 2025-05-14 PROCEDURE — 87086 URINE CULTURE/COLONY COUNT: CPT | Performed by: STUDENT IN AN ORGANIZED HEALTH CARE EDUCATION/TRAINING PROGRAM

## 2025-05-14 RX ORDER — SODIUM CHLORIDE 9 MG/ML
1000 INJECTION, SOLUTION INTRAVENOUS ONCE
Status: COMPLETED | OUTPATIENT
Start: 2025-05-14 | End: 2025-05-14

## 2025-05-14 RX ORDER — ONDANSETRON 2 MG/ML
4 INJECTION INTRAMUSCULAR; INTRAVENOUS ONCE
Status: COMPLETED | OUTPATIENT
Start: 2025-05-14 | End: 2025-05-14

## 2025-05-14 RX ORDER — ONDANSETRON 4 MG/1
4 TABLET, ORALLY DISINTEGRATING ORAL EVERY 6 HOURS PRN
Qty: 8 TABLET | Refills: 0 | Status: SHIPPED | OUTPATIENT
Start: 2025-05-14 | End: 2025-05-16

## 2025-05-14 NOTE — ED PROVIDER NOTES
Patient Seen in: [facility]      History     Chief Complaint   Patient presents with    Nausea/Vomiting/Diarrhea     Stated Complaint: vomiting diarrhea    Subjective:   HPI    24-year-old female with past medical history of ocular migraines, who presents with concern for about 5 days of nausea, nonbloody emesis, and nonbloody diarrhea, stool is watery but she states frequency is decreasing, no recent travel, no hematochezia, had mild lower abdominal cramping but feels this has resolved, no reported fevers, does feel fatigued and generally weak, states her daughter has similar symptoms but less severe.  Denies any recent antibiotics.    Objective:     Past Medical History:    Migraines    hx of ocular migraines    Preeclampsia (HCC)              Past Surgical History:   Procedure Laterality Date          x 2                Social History     Socioeconomic History    Marital status: Single   Tobacco Use    Smoking status: Former     Current packs/day: 0.00     Types: Cigarettes     Quit date: 2019     Years since quittin.4    Smokeless tobacco: Never   Vaping Use    Vaping status: Some Days   Substance and Sexual Activity    Alcohol use: Yes     Comment: OCC    Drug use: Yes     Types: Cannabis    Sexual activity: Yes     Birth control/protection: Mirena   Other Topics Concern    Caffeine Concern No     Social Drivers of Health      Received from [facility]    Housing Stability              Review of Systems    Positive for stated complaint: vomiting diarrhea  Other systems are as noted in HPI.  Constitutional and vital signs reviewed.      All other systems reviewed and negative except as noted above.                  Physical Exam     ED Triage Vitals [25 0945]   /90   Pulse 80   Resp 18   Temp 98.1 °F (36.7 °C)   Temp src Oral   SpO2 100 %   O2 Device None (Room air)       Current Vitals:   Vital Signs  BP: 130/90  Pulse: 80  Resp: 18  Temp: 98.1 °F  (36.7 °C)  Temp src: Oral    Oxygen Therapy  SpO2: 100 %  O2 Device: None (Room air)          Physical Exam    General: Awake, alert, comfortable on room air, in no distress, tolerating oral secretions, interactive but fatigued appearing  Pulmonary: Lungs CTA B, no wheezing, no conversational dyspnea  GI: Abdomen soft, nontender, nondistended, no rebound, no guarding, but reports intermittent lower abdominal discomfort  Neuro: Symmetrical facial expressions on gross observation  Musculoskeletal: No B/L CVA tenderness  HEENT: No periorbital edema or erythema, nonerythematous and nonedematous intact B/L TMs, nonerythematous and nonedematous B/L tonsils, no tonsillar exudates, no peritonsillar edema, uvula midline, tolerating oral secretions, normal speech, no submandibular edema  Psych: Normal mood, normal affect          ED Course     Labs Reviewed   POCT CBC - Abnormal; Notable for the following components:       Result Value    WBC IC 3.8 (*)     # Lymphocyte 0.8 (*)     All other components within normal limits   [facility] POCT URINALYSIS DIPSTICK - Abnormal; Notable for the following components:    Protein urine 30 (*)     Ketone, Urine 40 (*)     Bilirubin, Urine Small (*)     Blood, Urine Trace-lysed (*)     All other components within normal limits   POCT PREGNANCY URINE - Normal   POCT ISTAT CHEM8 CARTRIDGE - Normal   URINE CULTURE, ROUTINE     Collected 5/14/2025 09:49       Status: Final result    0 Result Notes      Component  Ref Range & Units 5/14/25 0949   Urine Color  Yellow Dark yellow   Urine Clarity  Clear Clear   Specific Gravity, Urine  1.005 - 1.030 >=1.030   PH, Urine  5.0 - 8.0 5.5   Protein urine  Negative mg/dL 30 Abnormal    Glucose, Urine  Negative mg/dL Negative   Ketone, Urine  Negative mg/dL 40 Abnormal    Bilirubin, Urine  Negative Small Abnormal    Blood, Urine  Negative Trace-lysed Abnormal    Nitrite Urine  Negative Negative   Urobilinogen urine  <2.0 mg/dL <2.0   Leukocyte esterase  urine  Negative Negative   [facility] ([facility])             Specimen Collected: 05/14/25 09:49 Last Resulted: 05/14/25 09:56         Collected 5/14/2025 10:22       Status: Final result    0 Result Notes      Component  Ref Range & Units 5/14/25 1022   WBC IC  4.0 - 11.0 x10ˆ3/uL 3.8 Low    RBC IC  3.80 - 5.30 X10ˆ6/uL 4.71   HGB IC  12.0 - 16.0 g/dL 13.7   HCT IC  35.0 - 48.0 % 41.7   MCV IC  80.0 - 100.0 fL 88.5   MCH IC  26.0 - 34.0 pg 29.1   MCHC IC  31.0 - 37.0 g/dL 32.9   PLT IC  150.0 - 450.0 X10ˆ3/uL 199.0   # Neutrophil  1.5 - 7.7 X10ˆ3/uL 2.6   # Lymphocyte  1.0 - 4.0 X10ˆ3/uL 0.8 Low    # Mixed Cells  0.1 - 1.0 X10ˆ3/uL 0.4   Neutrophil %  % 67.7   Lymphocyte %  % 22.2   Mixed Cell %  % 10.1   [facility] ([facility])             Specimen Collected: 05/14/25 10:22 Last Resulted: 05/14/25 10:35       Collected 5/14/2025 10:36       Status: Final result    0 Result Notes      Component  Ref Range & Units 5/14/25 1036   ISTAT Sodium  136 - 145 mmol/L 138   ISTAT BUN  7 - 18 mg/dL 12   ISTAT Potassium  3.6 - 5.1 mmol/L 3.8   ISTAT Chloride  98 - 112 mmol/L 100   ISTAT Ionized Calcium  1.12 - 1.32 mmol/L 1.19   ISTAT Hematocrit  34 - 50 % 45   ISTAT Glucose  70 - 99 mg/dL 79   ISTAT TCO2  21 - 32 mmol/L 22   ISTAT Creatinine  0.55 - 1.02 mg/dL 0.90   Comment: This test may exhibit falsely elevated results when a sample is collected from a patient who is presently taking Hydroxyurea. If patient is consuming Hydroxyurea, i-STAT creatinine analysis should be considered inaccurate and a sample should be referred to the Central Lab for analysis, if clinically indicated.   eGFR-Cr  >=60 mL/min/1.73m2 91   Comment: eGFR calculated using the CKD-EPI 2021 calculation   [facility] ([facility])             Specimen Collected: 05/14/25 10:36 Last Resulted: 05/14/25 10:39         MDM   Patient is awake, alert, comfortable on room air, in no distress, afebrile, lungs CTAB with no wheezing with no sign  of acute bronchospasm, abdomen is soft and currently nontender but has reported intermittent B/L lower abdominal cramping, discussed advanced imaging with CT scan to ensure no underlying acute abdomen such as diverticulitis or appendicitis or other etiology, but patient declines at this time, she is aware of limitation without CTAP to rule out acute abdomen and complications associated if going undiagnosed, she has been having vomiting and diarrhea with no hematochezia to suspect Shiga toxin, no recent travel to suspect ova and parasites, and no recent antibiotics to suspect C. difficile, therefore would not send stool studies at this time, daughter has similar symptoms and suspect more likely viral gastroenteritis, but given volume loss will assess CBC, BMP, urine, and urine pregnancy, and will treat with 1 L NSS IV fluids and 4 mg of IV Zofran, patient is agreeable to this plan  -Patient's urine shows trace lysed blood, no CVA tenderness to suspect nephrolithiasis, otherwise no nitrites and no leukocyte esterases, has had some urinary pressure but previously has been due to UTIs and has also not been due to UTIs, given diarrhea concern for potential UTI but would not prescribe antibiotics with only trace blood in urine as this could worsen her diarrhea and vomiting and at this time risk outweighs benefit without definite diagnosis, but will send urine culture  -Mild ketones in urine likely due to volume loss with normal glucose at 79, no suspicion for DKA  -Patient's urine is negative for pregnancy  -Patient's CBC shows very mild leukopenia possibly due to viral gastroenteritis, no neutropenia, encouraged patient to follow-up with her primary care physician once symptoms have resolved to discuss repeat CBC  -Patient's BMP shows normal kidney function and normal electrolytes  -I reassessment the patient after Zofran and IV fluids, reports feeling improved, Zofran prescribed  -We discussed bland diet and advancing  as tolerated as well as strict ED precautions      Medical Decision Making  Amount and/or Complexity of Data Reviewed  Labs: ordered.    Risk  Prescription drug management.          Disposition and Plan     Clinical Impression:  1. Vomiting, unspecified vomiting type, unspecified whether nausea present    2. Diarrhea, unspecified type    3. Leukopenia, unspecified type         Disposition:  Discharge  5/14/2025 11:14 am    Follow-up:  Nonstaff, Physician    In 3 days  As needed, If symptoms worsen          Medications Prescribed:  Discharge Medication List as of 5/14/2025 11:18 AM            ondansetron 4 MG Oral Tablet Dispersible 8 tablet 0 5/14/2025 5/16/2025   Sig:   Take 1 tablet (4 mg total) by mouth every 6 (six) hours as needed for Nausea.     Route:   Oral     PRN Reason(s):   Nausea

## 2025-05-14 NOTE — ED INITIAL ASSESSMENT (HPI)
Has seen urology on 4/7 for frequent uti, here for n/v/d with diffuse abd cramps since Saturday, + urinary symptoms, no flank or back pain, no fever, no recent travel, no sick contacts

## 2025-05-14 NOTE — DISCHARGE INSTRUCTIONS
Your blood work showed normal kidney function and normal electrolytes, your pregnancy test was negative, your urine showed mild blood in the urine but no other signs of infection, given your history we did send for urine culture to assess for any potential bacteria, and therefore if results change treatment plan we will call you to discuss your treatment plan.    Your blood work showed no anemia, did show mild decrease in your white blood cell count with mildly low leukocytes and lymphocytes.  This could be in the setting of viral infection, but I would recommend following up with your primary care physician, after symptoms have resolved, to discuss if you would benefit from a repeat assessment of complete blood count.    You declined advanced imaging of the abdomen at this time, but if symptoms progress, you develop fevers, any signs or symptoms of dehydration, I recommend immediate assessment in the emergency department.    I have prescribed Zofran which is an antinausea medication, I also recommend a bland diet such as bread, rice, applesauce, toast, and advancing as tolerated.

## 2025-05-22 NOTE — LACTATION NOTE
LACTATION NOTE - MOTHER      Evaluation Type: Inpatient    Problems identified  Problems identified: Knowledge deficit    Maternal history  Maternal history: PIH  Other/comment: preeclamptic    Breastfeeding goal  Breastfeeding goal: To maintain breast mil There are no Wet Read(s) to document.

## 2025-05-23 ENCOUNTER — APPOINTMENT (OUTPATIENT)
Dept: PHYSICAL THERAPY | Age: 25
End: 2025-05-23
Attending: PHYSICAL MEDICINE & REHABILITATION
Payer: MEDICAID

## 2025-05-27 ENCOUNTER — APPOINTMENT (OUTPATIENT)
Dept: PHYSICAL THERAPY | Age: 25
End: 2025-05-27
Attending: PHYSICAL MEDICINE & REHABILITATION
Payer: MEDICAID

## 2025-06-06 ENCOUNTER — APPOINTMENT (OUTPATIENT)
Dept: PHYSICAL THERAPY | Age: 25
End: 2025-06-06
Attending: PHYSICAL MEDICINE & REHABILITATION
Payer: MEDICAID

## 2025-06-10 ENCOUNTER — APPOINTMENT (OUTPATIENT)
Dept: PHYSICAL THERAPY | Age: 25
End: 2025-06-10
Attending: PHYSICAL MEDICINE & REHABILITATION
Payer: MEDICAID

## 2025-06-20 ENCOUNTER — APPOINTMENT (OUTPATIENT)
Dept: PHYSICAL THERAPY | Age: 25
End: 2025-06-20
Attending: PHYSICAL MEDICINE & REHABILITATION
Payer: MEDICAID

## 2025-07-01 ENCOUNTER — HOSPITAL ENCOUNTER (OUTPATIENT)
Dept: ULTRASOUND IMAGING | Facility: HOSPITAL | Age: 25
Discharge: HOME | End: 2025-07-01
Attending: NURSE PRACTITIONER
Payer: MEDICAID

## 2025-07-01 DIAGNOSIS — N64.4 BREAST PAIN, LEFT: ICD-10-CM

## 2025-07-01 PROCEDURE — 76642 ULTRASOUND BREAST LIMITED: CPT | Performed by: RADIOLOGY

## 2025-07-15 ENCOUNTER — OFFICE VISIT (OUTPATIENT)
Facility: LOCATION | Age: 25
End: 2025-07-15

## 2025-07-15 VITALS — BODY MASS INDEX: 28.15 KG/M2 | WEIGHT: 175.19 LBS | HEIGHT: 66 IN

## 2025-07-15 DIAGNOSIS — H92.03 OTALGIA OF BOTH EARS: Primary | ICD-10-CM

## 2025-07-15 PROCEDURE — 99203 OFFICE O/P NEW LOW 30 MIN: CPT | Performed by: STUDENT IN AN ORGANIZED HEALTH CARE EDUCATION/TRAINING PROGRAM

## 2025-07-15 PROCEDURE — 92504 EAR MICROSCOPY EXAMINATION: CPT | Performed by: STUDENT IN AN ORGANIZED HEALTH CARE EDUCATION/TRAINING PROGRAM

## 2025-07-15 RX ORDER — ACETIC ACID 20.65 MG/ML
4 SOLUTION AURICULAR (OTIC) 3 TIMES DAILY
Qty: 15 ML | Refills: 0 | Status: SHIPPED | OUTPATIENT
Start: 2025-07-15 | End: 2025-07-22

## 2025-07-15 NOTE — PROGRESS NOTES
[place]  OTOLARYNGOLOGY - HEAD & NECK SURGERY    7/15/2025     Reason for Consultation:     Chief Complaint   Patient presents with    New Patient    Ear Problem     Patient here for ear pain on both ears.          History of Present Illness:     History of Present Illness  Yarelis Groves is a 25 year old female who presents with ear pain and a stinging sensation. She is accompanied by her daughter.    She experiences intermittent stinging pain in her right ear, described as sharp and lasting about a minute before subsiding. She reports that the pain has become more prominent and occurs more often.    She denies significant hearing impairment, although she recently attended a concert. The symptoms were present before the concert. She has a history of ear issues as a child and has been swimming frequently, which she believes may contribute to the symptoms. Water sometimes gets trapped in her ears after swimming, although she attempts to remove it.    No jaw pain or other associated symptoms.    Past Medical History  Past Medical History[1]    Past Surgical History  Past Surgical History[2]    Family History  Family History[3]    Social History  Pediatric History   Patient Parents    Debbi Groves (Mother)    LAURITA VELASQUEZ (Father)     Other Topics Concern     Service Not Asked    Blood Transfusions Not Asked    Caffeine Concern No    Occupational Exposure Not Asked    Hobby Hazards Not Asked    Sleep Concern Not Asked    Stress Concern Not Asked    Weight Concern Not Asked    Special Diet Not Asked    Back Care Not Asked    Exercise Not Asked    Bike Helmet Not Asked    Seat Belt Not Asked    Self-Exams Not Asked   Social History Narrative    Not on file           Current Medications:  Current Medications[4]    Allergies  Allergies[5]    Review of Systems:     A comprehensive 10 point review of systems was completed.  Pertinent positives and negatives noted in the the HPI.    Physical Exam:     Height  5' 6\" (1.676 m), weight 175 lb 3.2 oz (79.5 kg), last menstrual period 04/02/2025, unknown if currently breastfeeding.    GENERAL: No acute distress, Comfortable appearing  FACE: HB 1/6, Normal Animation  HEAD: Normocephalic  EYES: EOMI, pupils equil  EARS: Bilateral Auricles Symmetric  NOSE: Nares patent bilaterally  ORAL CAVITY: Tongue mobile, Oropharynx clear, Floor of mouth clear, Posterior oropharynx normal  NECK: No palpable lymphadenopathy, thyroid not palpable, nontender    Canals:  Right: Clear, with some erythema of the lateral canal skin  Left: Clear, with some erythema of the lateral canal skin    Tympanic Membranes:  Right: Normal tympanic membrane, with no retraction, middle ear space clear  Left: Normal tympanic membrane. with no retraction middle ear space clear    TM Visualized Method:   Right TM examined via otomicroscopy.   Left TM examined via otomicroscopy.      Results:     Laboratory Data:  Lab Results   Component Value Date    WBC 8.4 09/16/2024    HGB 12.2 09/16/2024    HCT 36.5 09/16/2024    .0 09/16/2024    CREATSERUM 0.71 09/16/2024    BUN 8 (L) 09/16/2024     09/16/2024    K 3.5 09/16/2024     09/16/2024    CO2 28.0 09/16/2024    GLU 84 09/16/2024    CA 9.3 09/16/2024    ALB 4.5 09/16/2024    ALKPHO 45 09/16/2024    TP 6.9 09/16/2024    AST 13 09/16/2024    ALT 11 09/16/2024    LIP 29 09/16/2024         Imaging:  US BREAST LEFT LIMITED (CPT=76642)  Result Date: 7/1/2025  PROCEDURE: US BREAST LEFT LIMITED (CPT=76642) INDICATIONS: DX-N64.4 Breast pain, left; pain for 1 year in the left breast. No palpable areas of concern. No history of breast cancer. STEPHIE COMPARISON: There is no comparison for this study. TECHNIQUE: Breast ultrasound was performed with evaluation only on specific areas of concern. FINDINGS: Targeted imaging over the areas of patient pain in the left breast from 9-12 o'clock, 10 cm from the nipple was performed. No cystic or solid masses are seen. There  is no parenchymal edema or skin thickening.     CONCLUSION: No sonographic correlate to the history of left breast pain. No sonographic abnormality appreciated.  BI-RADS CATEGORY: 1: Negative LATERALITY: Left RECOMMENDATION: Clinical Correlation Electronically Verified and Signed by Attending Radiologist: Mary Willard MD 2025 10:11 AM Workstation: [id number]      Impression:       ICD-10-CM    1. Otalgia of both ears  H92.03            Recommendations:       Inflammation of ear canal  Intermittent stinging pain in the right ear, more prominent recently, lasting about a minute and recurring. No significant hearing impairment. Pain localized to the ear canal, likely due to moisture from frequent swimming. Eardrum appears healthy, suggesting no middle ear involvement.   - Prescribe acetic acid ear drops for her to use 3 times a day for 7 days  -She will reach out if she has any persistent issues.       Thank you for allowing me to participate in the care of your patient.    Shan Mondragon, DO   Otolaryngology/Rhinology, Sinus, and Endoscopic Skull Base Surgery  [address] [place], [place] [id number]  Phone [phone]  Fax [phone]  7/15/2025  1:40 PM  7/15/2025          [1]   Past Medical History:   Migraines    hx of ocular migraines    Preeclampsia (HCC)   [2]   Past Surgical History:  Procedure Laterality Date          x 2   [3]   Family History  Problem Relation Age of Onset    Diabetes Other         Family h/o Diabetes    Blindness Other         Family h/o Blindness   [4]   Current Outpatient Medications   Medication Sig Dispense Refill    acetic acid 2 % Otic Solution Place 4 drops into both ears 3 (three) times daily for 7 days. 15 mL 0    Levonorgestrel (MIRENA, 52 MG,) 20 MCG/DAY Intrauterine IUD 20 mcg (1 each total) by Intrauterine route one time.     [5] No Known Allergies

## 2025-07-15 NOTE — PROGRESS NOTES
The following individual(s) verbally consented to be recorded using ambient AI listening technology and understand that they can each withdraw their consent to this listening technology at any point by asking the clinician to turn off or pause the recording:    Yes to consent     Patient name: Yarelis Groves

## 2025-07-24 ENCOUNTER — OFFICE VISIT (OUTPATIENT)
Dept: DERMATOLOGY CLINIC | Facility: CLINIC | Age: 25
End: 2025-07-24

## 2025-07-24 DIAGNOSIS — L30.9 ECZEMA, UNSPECIFIED TYPE: ICD-10-CM

## 2025-07-24 DIAGNOSIS — L70.0 ACNE VULGARIS: Primary | ICD-10-CM

## 2025-07-24 PROCEDURE — 99213 OFFICE O/P EST LOW 20 MIN: CPT | Performed by: PHYSICIAN ASSISTANT

## 2025-07-24 RX ORDER — CLINDAMYCIN PHOSPHATE 10 UG/ML
1 LOTION TOPICAL DAILY
Qty: 60 ML | Refills: 3 | Status: SHIPPED | OUTPATIENT
Start: 2025-07-24

## 2025-07-24 RX ORDER — TRIAMCINOLONE ACETONIDE 1 MG/G
1 OINTMENT TOPICAL 2 TIMES DAILY
Qty: 80 G | Refills: 3 | Status: SHIPPED | OUTPATIENT
Start: 2025-07-24

## 2025-07-24 NOTE — PROGRESS NOTES
HPI:    Patient ID: Yarelis Groves is a 25 year old female.    Patient presents with acne to the face. Started after getting her face waxed and threaded. No draining or tenderness noted. Sometimes painful. She has used OTC Cetaphil wash with no improvement. Has eczema on the arms. No draining or tenderness noted. No scaling noted. No erythema noted. No allergies to medications noted.         Review of Systems   Constitutional:  Negative for chills and fever.   Musculoskeletal:  Negative for arthralgias and myalgias.   Skin:  Positive for rash. Negative for color change and wound.          Current Medications[1]  Allergies:Allergies[2]   LMP 04/02/2025   There is no height or weight on file to calculate BMI.  PHYSICAL EXAM:   Physical Exam  Constitutional:       General: She is not in acute distress.     Appearance: Normal appearance.   Skin:     General: Skin is warm and dry.      Findings: Rash present.      Comments: Cystic acne noted on the chin. No draining or tendenress noted. Slight erythema noted.     Eczematous areas noted on the right forearm. No draining or tendenress noted. No scaling noted. Slight erythema noted.    Neurological:      Mental Status: She is alert and oriented to person, place, and time.                ASSESSMENT/PLAN:   1. Acne vulgaris  -After discussion with patient, advised the following:  -Start clidnamycin   -Educated to apply 2 times per day for the next few weeks  -Return if not improving in the next few weeks.   -To call or follow-up with worsening symptoms or concerns  -Patient was agreeable to plan and will comply with discussion above.     - clindamycin 1 % External Lotion; Apply 1 Application topically daily.  Dispense: 60 mL; Refill: 3    2. Eczema, unspecified type  -After discussion with patient, advised the following:  -Start triamcionlone  -Educated to apply 2 times per day for 2 weeks.   -To call or follow-up with worsening symptoms or concerns  -Patient was  agreeable to plan and will comply with discussion above.         No orders of the defined types were placed in this encounter.      Meds This Visit:  Requested Prescriptions     Signed Prescriptions Disp Refills    clindamycin 1 % External Lotion 60 mL 3     Sig: Apply 1 Application topically daily.    triamcinolone 0.1 % External Ointment 80 g 3     Sig: Apply 1 Application topically 2 (two) times daily.       Imaging & Referrals:  None         ID#[id number]       [1]   Current Outpatient Medications   Medication Sig Dispense Refill    clindamycin 1 % External Lotion Apply 1 Application topically daily. 60 mL 3    triamcinolone 0.1 % External Ointment Apply 1 Application topically 2 (two) times daily. 80 g 3    Levonorgestrel (MIRENA, 52 MG,) 20 MCG/DAY Intrauterine IUD 20 mcg (1 each total) by Intrauterine route one time.     [2] No Known Allergies

## (undated) DEVICE — Device

## (undated) DEVICE — LAWSON - SUT VIC 0 CTXB JB978

## (undated) DEVICE — LAWSON - CANISTER SUCT W/LID 3000CC

## (undated) DEVICE — LAWSON - MAXITUBE FLITES 30X77IN

## (undated) DEVICE — ABDOMINAL PAD: Brand: CURITY

## (undated) DEVICE — C SECTION PACK: Brand: MEDLINE INDUSTRIES, INC.

## (undated) DEVICE — TRAY CATH BDX IC 14FR 2L FL

## (undated) DEVICE — REM POLYHESIVE ADULT PATIENT RETURN ELECTRODE: Brand: VALLEYLAB

## (undated) DEVICE — VIOLET BRAIDED (POLYGLACTIN 910), SYNTHETIC ABSORBABLE SUTURE: Brand: COATED VICRYL

## (undated) DEVICE — SUTURE VICRYL 0 J340H

## (undated) DEVICE — 3M™ STERI-STRIP™ REINFORCED ADHESIVE SKIN CLOSURES, R1547, 1/2 IN X 4 IN (12 MM X 100 MM), 6 STRIPS/ENVELOPE: Brand: 3M™ STERI-STRIP™

## (undated) DEVICE — KENDALL SCD EXPRESS SLEEVES, KNEE LENGTH, MEDIUM: Brand: KENDALL SCD

## (undated) DEVICE — COVER SGL STRL LGHT HNDL BLU

## (undated) DEVICE — NON-ADHERENT PAD PREPACK: Brand: TELFA

## (undated) NOTE — LETTER
Date & Time: 10/8/2019, 1:46 PM  Patient: Chuck Bryson  Encounter Provider(s):    DIPAK Elias       To Whom It May Concern:    Chuck Bryson was seen and treated in our department on 10/8/2019. She should not return to work until 10/10/19.

## (undated) NOTE — LETTER
7/19/2021              [address] [facility]         To Whom It May Concern,    Faith Ricketts is currently under my care. She may not return to work for 4 weeks.     Please call with michelle

## (undated) NOTE — LETTER
7/19/2021              Grgeory Newton        [address] [facility]         To Whom It May Concern,    Gregory Newton is currently under my care. She may not return to work for 4 weeks.     Please call with questions

## (undated) NOTE — ED AVS SNAPSHOT
Claudia Cortez   MRN: [id number]    Department:  [facility] Emergency Department   Date of Visit:  10/8/2019           Disclosure     Insurance plans vary and the physician(s) referred by the ER may not be covered by your plan.  Please contact CARE PHYSICIAN AT ONCE OR RETURN IMMEDIATELY TO THE EMERGENCY DEPARTMENT. If you have been prescribed any medication(s), please fill your prescription right away and begin taking the medication(s) as directed.   If you believe that any of the medications

## (undated) NOTE — ED AVS SNAPSHOT
[facility] Emergency Department  [address] [place] [address]  Phone:  [phone]  Fax:  [phone]          Jackie Coburn   MRN: [id number]    Department:  [facility] Emergency Department   Date of Visit:  7/11/2017 visiting [email or web address].    IF THERE IS ANY CHANGE OR WORSENING OF YOUR CONDITION, CALL YOUR PRIMARY CARE PHYSICIAN AT ONCE OR RETURN IMMEDIATELY TO THE EMERGENCY DEPARTMENT.     If you have been prescribed any medication(s), please fill your prescription